# Patient Record
Sex: FEMALE | Race: OTHER | HISPANIC OR LATINO | Employment: UNEMPLOYED | URBAN - METROPOLITAN AREA
[De-identification: names, ages, dates, MRNs, and addresses within clinical notes are randomized per-mention and may not be internally consistent; named-entity substitution may affect disease eponyms.]

---

## 2018-05-29 ENCOUNTER — HOSPITAL ENCOUNTER (EMERGENCY)
Facility: HOSPITAL | Age: 48
Discharge: LEFT BEFORE DISCHARGE | End: 2018-05-29
Attending: EMERGENCY MEDICINE
Payer: COMMERCIAL

## 2018-05-29 VITALS
RESPIRATION RATE: 19 BRPM | HEIGHT: 63 IN | OXYGEN SATURATION: 100 % | SYSTOLIC BLOOD PRESSURE: 197 MMHG | BODY MASS INDEX: 31.89 KG/M2 | DIASTOLIC BLOOD PRESSURE: 109 MMHG | HEART RATE: 57 BPM | WEIGHT: 180 LBS | TEMPERATURE: 97.7 F

## 2018-05-29 DIAGNOSIS — K08.89 PAIN, DENTAL: Primary | ICD-10-CM

## 2018-05-29 PROCEDURE — 99283 EMERGENCY DEPT VISIT LOW MDM: CPT

## 2018-05-29 RX ORDER — AMOXICILLIN 500 MG/1
500 CAPSULE ORAL EVERY 8 HOURS SCHEDULED
COMMUNITY
End: 2019-07-09

## 2018-05-29 RX ORDER — OXYCODONE HYDROCHLORIDE AND ACETAMINOPHEN 5; 325 MG/1; MG/1
1 TABLET ORAL ONCE
Status: COMPLETED | OUTPATIENT
Start: 2018-05-29 | End: 2018-05-29

## 2018-05-29 RX ORDER — IBUPROFEN 600 MG/1
600 TABLET ORAL EVERY 6 HOURS PRN
COMMUNITY
End: 2019-07-09

## 2018-05-29 RX ADMIN — OXYCODONE HYDROCHLORIDE AND ACETAMINOPHEN 1 TABLET: 5; 325 TABLET ORAL at 05:03

## 2018-05-29 NOTE — ED ATTENDING ATTESTATION
Verona Valles MD, saw and evaluated the patient  I have discussed the patient with the resident/non-physician practitioner and agree with the resident's/non-physician practitioner's findings, Plan of Care, and MDM as documented in the resident's/non-physician practitioner's note, except where noted  All available labs and Radiology studies were reviewed  At this point I agree with the current assessment done in the Emergency Department  I have conducted an independent evaluation of this patient a history and physical is as follows:    OA: 54 y/o f c/o dental pain x 1 week  Pain is over L upper incisor  Seen by dentist on Thursday and prescribed abx and motrin for pain control however sxms persistent so presents now for evaluation  Also noted some mild swelling today  No fevers/chills  No difficulty swallowing  No neck pain  No headache  No eye pain/visual change  No fevers  PE, well developed f, HTN, noncompliant with medication x years, NC/AT, MMM, PERRL, EOMIB, + palpable area of induration over L incisor as well as along maxillary sinus with mild swelling and small patch of erythema, + ttp, no warmth  Neck supple/FROM/-LN, RR, lungs CTAB, abd soft, NT/ND, +BS, -r/g, - LE edema/calf ttp, + 2 distal pulses and capillary refill < 2 sec, AAOx3  A/p dental pain, concern for abscess, discussed need for imaging and possibility of worsening infection/progression  Discussion in [address] and [address]  Family at bedside express understanding  Pt given pain control while thinking about imaging       Critical Care Time  CritCare Time    Procedures

## 2018-05-29 NOTE — ED PROVIDER NOTES
History  Chief Complaint   Patient presents with    Dental Pain     Pt  reports left upper molar pain and pressure  Denies fever/chills  This is a 27-year-old female who past medical history presents to the emergency department this morning with left maxillary dental pain for the past week  Patient states that she saw a dentist last Thursday who prescribed her amoxicillin ibuprofen  However, patient states that these medications have not helped and she is coming in for further evaluation  Patient denies taking any other medications, she has not had any dental operations in her mouth recently, and denies any trauma  Patient denies any facial numbness but states that it feels like her face is swollen  Patient denies any fevers or chills  Prior to Admission Medications   Prescriptions Last Dose Informant Patient Reported? Taking?   amoxicillin (AMOXIL) 500 mg capsule 5/28/2018 at Unknown time  Yes Yes   Sig: Take 500 mg by mouth every 8 (eight) hours   ibuprofen (MOTRIN) 600 mg tablet 5/28/2018 at Unknown time  Yes Yes   Sig: Take 600 mg by mouth every 6 (six) hours as needed for mild pain      Facility-Administered Medications: None       Past Medical History:   Diagnosis Date    Hypertension        History reviewed  No pertinent surgical history  History reviewed  No pertinent family history  I have reviewed and agree with the history as documented  Social History   Substance Use Topics    Smoking status: Never Smoker    Smokeless tobacco: Never Used    Alcohol use No        Review of Systems   Constitutional: Negative for chills, fatigue and fever  HENT: Positive for dental problem  Negative for congestion, rhinorrhea, sinus pressure and sore throat  Eyes: Negative for visual disturbance  Respiratory: Negative for cough and shortness of breath  Cardiovascular: Negative for chest pain     Gastrointestinal: Negative for abdominal pain, constipation, diarrhea, nausea and vomiting  Genitourinary: Negative for dysuria, frequency, hematuria and urgency  Musculoskeletal: Negative for arthralgias and myalgias  Skin: Negative for color change and rash  Neurological: Negative for dizziness, light-headedness and numbness  Physical Exam  ED Triage Vitals [05/29/18 0312]   Temperature Pulse Respirations Blood Pressure SpO2   97 7 °F (36 5 °C) 57 19 (!) 197/109 100 %      Temp Source Heart Rate Source Patient Position - Orthostatic VS BP Location FiO2 (%)   Oral Monitor Sitting Left arm --      Pain Score       Worst Possible Pain           Orthostatic Vital Signs  Vitals:    05/29/18 0312   BP: (!) 197/109   Pulse: 57   Patient Position - Orthostatic VS: Sitting       Physical Exam   Constitutional: She is oriented to person, place, and time  She appears well-developed and well-nourished  No distress  HENT:   Head: Normocephalic and atraumatic  Patient was small, half a cm area of erythema and edema where the upper lip meets the maxillary gingiva near tooth 12 and 13  Patient's left side of her face also edematous compared to the right with induration tracking up along the left nasal labial fold  Eyes: Conjunctivae are normal  Pupils are equal, round, and reactive to light  Right eye exhibits no discharge  Left eye exhibits no discharge  Neck: Normal range of motion  Cardiovascular: Normal rate, regular rhythm and normal heart sounds  Exam reveals no gallop and no friction rub  No murmur heard  Pulmonary/Chest: Effort normal and breath sounds normal  No stridor  No respiratory distress  She has no wheezes  She has no rales  Abdominal: Soft  Bowel sounds are normal  She exhibits no distension  There is no tenderness  There is no guarding  Musculoskeletal: Normal range of motion  She exhibits no edema, tenderness or deformity  Neurological: She is alert and oriented to person, place, and time  No cranial nerve deficit or sensory deficit   She exhibits normal muscle tone  Skin: Skin is warm and dry  She is not diaphoretic  Psychiatric: She has a normal mood and affect  Her behavior is normal    Nursing note and vitals reviewed  ED Medications  Medications   oxyCODONE-acetaminophen (PERCOCET) 5-325 mg per tablet 1 tablet (1 tablet Oral Given 5/29/18 0503)       Diagnostic Studies  Results Reviewed     None                 No orders to display         Procedures  Procedures      Phone Consults  ED Phone Contact    ED Course                               MDM  Number of Diagnoses or Management Options  Pain, dental:   Diagnosis management comments:   Offered patient a CT scan of her face, which she refused because she did not want a wait  Patient was made to sign out against medical advice after giving her one Percocet for her pain  Patient states that she will call her dentist today and trying get an appointment for follow-up  Patient given instructions to return to the emergency department should she develop any new or concerning symptoms  CritCare Time    Disposition  Final diagnoses:   Pain, dental     Time reflects when diagnosis was documented in both MDM as applicable and the Disposition within this note     Time User Action Codes Description Comment    5/29/2018  5:19 AM Tawana Walden Add [K08 89] Pain, dental       ED Disposition     ED Disposition Condition Comment    AMA  Date: 5/29/2018  Patient: Jaimie Urrutia  Admitted: 5/29/2018  3:15 AM  Attending Provider: MD Jaimie Raymond or her authorized caregiver has made the decision for the patient to leave the emergency department against the advice of her  attending physician  She or her authorized caregiver has been informed and understands the inherent risks, including death, infection, permanent disability, sepsis, stroke, death  She or her authorized caregiver has decided to accept the responsibility  for this decision   Jaimie Urrutia and all necessary parties have been advised that she may return for further evaluation or treatment  Her condition at time of discharge was good  Marita Hodgkin had current vital signs as follows:  BP (!) 197/109 (BP Locat ion: Left arm)   Pulse 57   Temp 97 7 °F (36 5 °C) (Oral)   Resp 19   Ht 5' 3" (1 6 m)   Wt 81 6 kg (180 lb)         Follow-up Information     Follow up With Specialties Details Why [address], DO Internal Medicine   [address]  [phone]      Pablo Mays DMD [facility]   [address] [facility] [place] [address]  [phone]            Discharge Medication List as of 5/29/2018  5:21 AM      CONTINUE these medications which have NOT CHANGED    Details   amoxicillin (AMOXIL) 500 mg capsule Take 500 mg by mouth every 8 (eight) hours, Historical Med      ibuprofen (MOTRIN) 600 mg tablet Take 600 mg by mouth every 6 (six) hours as needed for mild pain, Historical Med           No discharge procedures on file  ED Provider  Attending physically available and evaluated Marita Hodgkin  I managed the patient along with the ED Attending      Electronically Signed by         Maximo Meraz MD  05/30/18 9853

## 2018-05-29 NOTE — DISCHARGE INSTRUCTIONS
Dolor de [employer]   LO QUE NECESITA SABER:   Un dolor de SMITH'S GREEN que es causado por kika inflamación del nervio en el centro del diente  La irritación puede ser causada por varios problemas, manish por caries, kika infección, un diente vencido o enfermedad de las encías  Es muy importante que acuda a kika pilar de control con hardin odontólogo para que le puedan diagnosticar la causa de hardin dolor de SMITH'S GREEN y recibir tratamiento  Lo cual puede ayudar a prevenir problemas serios  Jefry Rosa [facility]:   Medicamentos:  Es posible que usted necesite alguno de los siguientes:  · AINEs (Analgésicos antiinflamatorios no esteroides)  disminuyen la inflamación y el dolor  Martita medicamento se puede comprar con o sin receta médica  Martita medicamento puede causar sangrado estomacal o problemas en los riñones en ciertas personas  Si usted keturah un medicamento anticoagulante, asegúrese de preguntarle a hardin médico si los MEENAKSHI son seguros para usted  Scarlet siempre la etiqueta y siga cuidadosamente las instrucciones antes de usar martita medicamento  · El acetaminofén  [employer]  Está disponible sin receta médica  Pregunte la cantidad y la frecuencia con que debe tomarlos  [address]  El acetaminofén puede causar daño en el [employer]ado cuando no se keturah de forma correcta  · Los analgésicos  hardin forma de presentación puede ser en píldora o manish un medicamento que se aplica directamente en el diente o las encías  No espere hasta que el dolor sea severo antes de ed martita medicamento  · Antibióticos  contribuyen a combatir o evitar que el jarret contraiga kika infección causada por kika bacteria  Tómelos nathanael [employer]  · [place] gatito medicamentos manish se le haya indicado  Consulte con hardin médico si usted candida que hardin medicamento no le está ayudando o si presenta efectos secundarios  Infórmele si es alérgico a algún medicamento   Mantenga kika lista actualizada de los [place], las vitaminas y los productos herbales que keturah  Incluya los siguientes datos de los medicamentos: cantidad, frecuencia y motivo de administración  Traiga con usted la lista o los envases de la píldoras a gatito citas de seguimiento  Lleve la lista de los medicamentos con usted en michelle de kika emergencia  Programe kika pilar de seguimiento con flores dentista nathanael manish se le indica:  Es posible que lo refieran a un odontólogo cirujano  Anote gatito preguntas para que se acuerde de hacerlas jackie gatito visitas  Cuidados personales:   · Enjuague la boca con agua tibia con sal 4 veces al día o según las indicaciones  · Es posible que necesite comer alimentos blandos para aliviar el dolor que le causa masticar  Comuníquese con flores dentista si:   · Usted tiene preguntas o inquietudes acerca de flores condición o cuidado  Regrese a la callum de emergencias si:   · Usted tiene dificultad para respirar  · Usted tiene flores yazmin o andrew inflamados  · Usted tiene fiebre o escalofríos  · Usted tiene dificultad para hablar o tragar  · Usted tiene dificultad para abrir o cerrar la boca  © 2017 [address] Information is for End User's use only and may not be sold, redistributed or otherwise used for commercial purposes  All illustrations and images included in CareNotes® are the copyrighted property of A D A M , Inc  or Mars Betts  Esta información es sólo para uso en educación  Flores intención no es darle un consejo médico sobre enfermedades o tratamientos  Colsulte con flores [employer] farm[employer] antes de seguir cualquier régimen médico para saber si es seguro y efectivo para usted

## 2019-07-08 PROBLEM — Z86.718 HISTORY OF DVT (DEEP VEIN THROMBOSIS): Status: ACTIVE | Noted: 2019-07-08

## 2019-07-09 ENCOUNTER — TRANSCRIBE ORDERS (OUTPATIENT)
Dept: ADMINISTRATIVE | Facility: HOSPITAL | Age: 49
End: 2019-07-09

## 2019-07-09 ENCOUNTER — OFFICE VISIT (OUTPATIENT)
Dept: FAMILY MEDICINE CLINIC | Facility: CLINIC | Age: 49
End: 2019-07-09
Payer: COMMERCIAL

## 2019-07-09 VITALS
TEMPERATURE: 98.3 F | BODY MASS INDEX: 34.23 KG/M2 | WEIGHT: 200.5 LBS | DIASTOLIC BLOOD PRESSURE: 84 MMHG | SYSTOLIC BLOOD PRESSURE: 112 MMHG | HEIGHT: 64 IN | OXYGEN SATURATION: 96 % | HEART RATE: 83 BPM | RESPIRATION RATE: 16 BRPM

## 2019-07-09 DIAGNOSIS — G44.211 INTRACTABLE EPISODIC TENSION-TYPE HEADACHE: Primary | ICD-10-CM

## 2019-07-09 DIAGNOSIS — K21.9 GASTROESOPHAGEAL REFLUX DISEASE, ESOPHAGITIS PRESENCE NOT SPECIFIED: ICD-10-CM

## 2019-07-09 DIAGNOSIS — Z12.31 SCREENING MAMMOGRAM, ENCOUNTER FOR: ICD-10-CM

## 2019-07-09 DIAGNOSIS — Z13.220 LIPID SCREENING: ICD-10-CM

## 2019-07-09 DIAGNOSIS — H11.001 PTERYGIUM EYE, RIGHT: ICD-10-CM

## 2019-07-09 DIAGNOSIS — Z12.31 ENCOUNTER FOR SCREENING MAMMOGRAM FOR MALIGNANT NEOPLASM OF BREAST: Primary | ICD-10-CM

## 2019-07-09 DIAGNOSIS — E66.9 OBESITY (BMI 30.0-34.9): ICD-10-CM

## 2019-07-09 PROBLEM — G44.209 TENSION TYPE HEADACHE: Status: ACTIVE | Noted: 2019-07-09

## 2019-07-09 PROBLEM — Z12.39 BREAST CANCER SCREENING: Status: ACTIVE | Noted: 2019-07-09

## 2019-07-09 PROBLEM — E66.811 OBESITY (BMI 30.0-34.9): Status: ACTIVE | Noted: 2019-07-09

## 2019-07-09 PROBLEM — Z12.39 BREAST CANCER SCREENING: Status: RESOLVED | Noted: 2019-07-09 | Resolved: 2019-07-09

## 2019-07-09 PROCEDURE — 3725F SCREEN DEPRESSION PERFORMED: CPT | Performed by: PHYSICIAN ASSISTANT

## 2019-07-09 PROCEDURE — 99214 OFFICE O/P EST MOD 30 MIN: CPT | Performed by: PHYSICIAN ASSISTANT

## 2019-07-09 PROCEDURE — 3008F BODY MASS INDEX DOCD: CPT | Performed by: PHYSICIAN ASSISTANT

## 2019-07-09 RX ORDER — IBUPROFEN 600 MG/1
TABLET ORAL
Qty: 40 TABLET | Refills: 1 | Status: SHIPPED | OUTPATIENT
Start: 2019-07-09 | End: 2021-02-23 | Stop reason: ALTCHOICE

## 2019-07-09 RX ORDER — FAMOTIDINE 20 MG/1
TABLET, FILM COATED ORAL
Qty: 90 TABLET | Refills: 1 | Status: SHIPPED | OUTPATIENT
Start: 2019-07-09 | End: 2021-02-23

## 2019-07-09 NOTE — PROGRESS NOTES
Assessment/Plan:    BMI Counseling: Body mass index is 34 96 kg/m²  Discussed the patient's BMI with her  The BMI is above average  BMI counseling and education was provided to the patient  Nutrition recommendations include reducing portion sizes and decreasing overall calorie intake  Exercise recommendations include exercising 3-5 times per week  Patient Instructions   Patient advised to continue with ibuprofen but I did send a prescription strength of 600 mg to take at onset of headache with food, no alcohol as needed  Follow-up if headaches become more frequent more than twice a month    Take Famotidine 20 mg as needed once daily for reflux symptoms which is also infrequent  Follow-up if symptoms become more persistent  Avoid spicy, acidic, citrus foods  Continue daily water intake  Importance of walking daily for at least 20 minutes for exercise and weight loss discussed  Proceed with fasting blood work  Mammogram ordered and scheduled  Continue follow-up with the eye doctor for the pterygium      [email or web address] software was used to dictate this note  It may contain errors with dictating incorrect words/spelling  Please contact provider directly for any questions  Diagnoses and all orders for this visit:    Intractable episodic tension-type headache  -     ibuprofen (MOTRIN) 600 mg tablet; Take 1 tablet every 6 hours as needed for headache with food, no alcohol  -     Comprehensive metabolic panel  -     Lipid panel  -     TSH, 3rd generation with Free T4 reflex  -     CBC and differential    Gastroesophageal reflux disease, esophagitis presence not specified  -     famotidine (PEPCID) 20 mg tablet;  Take 1 tablet daily as needed for reflux symptoms  -     Comprehensive metabolic panel  -     Lipid panel  -     TSH, 3rd generation with Free T4 reflex  -     CBC and differential    Pterygium eye, right    Screening mammogram, encounter for    Lipid screening  -     Comprehensive metabolic panel  -     Lipid panel  -     TSH, 3rd generation with Free T4 reflex  -     CBC and differential    Obesity (BMI 30 0-34 9)  -     Comprehensive metabolic panel  -     Lipid panel  -     TSH, 3rd generation with Free T4 reflex  -     CBC and differential          Subjective:      Patient ID: Rc Ervin is a 51 y o  female  Patient presents today with her 6year-old daughter who helps a little bit with translation in [address]  Patient does understand a lot of [place]  She has several concerns at this time  Over the last several months she states that she has been experiencing a headache at least once or twice a month  She states his across her forehead  She takes over-the-counter ibuprofen 400 mg which alleviates the headache  She denies any visual changes, nausea, vomiting associated with the headache  She also complains of intermittent reflux symptoms with a feeling of wanting to vomit at least once a month  She does not notice it with any specific foods  She denies any nausea, vomiting or changes in her bowels  She has not tried any treatment over-the-counter  She does not exercise  She does drink water all day  It has been quite awhile since she has had any blood work  She states has been quite awhile since she is taking any medication for her blood pressure which has been under good control without medicine  She did see the eye doctor several months ago  She states the doctor is aware of the pterygium which he is monitoring  She does were glasses  The following portions of the patient's history were reviewed and updated as appropriate:   She  has a past medical history of Hypertension    She   Patient Active Problem List    Diagnosis Date Noted    Tension type headache 07/09/2019    GERD (gastroesophageal reflux disease) 07/09/2019    Pterygium eye, right 07/09/2019    Lipid screening 07/09/2019    Obesity (BMI 30 0-34 9) 07/09/2019    History of DVT (deep vein thrombosis) 07/08/2019 She  has a past surgical history that includes Other surgical history  Her family history is not on file  She  reports that she has never smoked  She has never used smokeless tobacco  She reports that she does not drink alcohol or use drugs  Current Outpatient Medications   Medication Sig Dispense Refill    famotidine (PEPCID) 20 mg tablet Take 1 tablet daily as needed for reflux symptoms 90 tablet 1    ibuprofen (MOTRIN) 600 mg tablet Take 1 tablet every 6 hours as needed for headache with food, no alcohol 40 tablet 1     No current facility-administered medications for this visit  Current Outpatient Medications on File Prior to Visit   Medication Sig    [DISCONTINUED] amoxicillin (AMOXIL) 500 mg capsule Take 500 mg by mouth every 8 (eight) hours    [DISCONTINUED] aspirin 81 MG tablet Take 1 tablet by mouth daily    [DISCONTINUED] hydrochlorothiazide (HYDRODIURIL) 25 mg tablet Take 1 tablet by mouth daily    [DISCONTINUED] ibuprofen (MOTRIN) 600 mg tablet Take 600 mg by mouth every 6 (six) hours as needed for mild pain    [DISCONTINUED] lisinopril (ZESTRIL) 10 mg tablet Take 1 tablet by mouth daily     No current facility-administered medications on file prior to visit  She has No Known Allergies       Review of Systems   Constitutional: Negative  HENT: Negative  Eyes:        As stated in HPI   Respiratory: Negative  Cardiovascular: Negative  Gastrointestinal:        As stated in HPI   Endocrine: Negative  Genitourinary: Negative  Musculoskeletal: Negative  Allergic/Immunologic: Negative  Neurological:        As stated in HPI   Hematological: Negative  Psychiatric/Behavioral: Negative            Objective:      /84 (BP Location: Left arm, Patient Position: Sitting, Cuff Size: Large)   Pulse 83   Temp 98 3 °F (36 8 °C) (Tympanic)   Resp 16   Ht 5' 3 5" (1 613 m)   Wt 90 9 kg (200 lb 8 oz)   SpO2 96%   BMI 34 96 kg/m²          Physical Exam Constitutional: She appears well-developed and well-nourished  No distress  HENT:   Head: Normocephalic and atraumatic  Right Ear: External ear normal    Left Ear: External ear normal    Nose: Nose normal    Mouth/Throat: Oropharynx is clear and moist    Eyes: Pupils are equal, round, and reactive to light  Conjunctivae and EOM are normal    Pterygium noted of right medial scleral region   Neck: Normal range of motion  Neck supple  No thyromegaly present  Cardiovascular: Normal rate, regular rhythm and normal heart sounds  Exam reveals no gallop and no friction rub  No murmur heard  Pulmonary/Chest: Effort normal and breath sounds normal  No respiratory distress  She has no wheezes  She has no rales  Abdominal: Soft  Bowel sounds are normal  She exhibits no mass  There is no tenderness  Musculoskeletal: She exhibits no edema or deformity  Lymphadenopathy:     She has no cervical adenopathy  Neurological: She is alert  Skin: Skin is warm  Psychiatric: She has a normal mood and affect

## 2019-07-09 NOTE — PATIENT INSTRUCTIONS
Patient advised to continue with ibuprofen but I did send a prescription strength of 600 mg to take at onset of headache with food, no alcohol as needed  Follow-up if headaches become more frequent more than twice a month    Take Famotidine 20 mg as needed once daily for reflux symptoms which is also infrequent    Follow-up if symptoms become more persistent  Avoid spicy, acidic, citrus foods  Continue daily water intake  Importance of walking daily for at least 20 minutes for exercise and weight loss discussed  Proceed with fasting blood work  Mammogram ordered and scheduled  Continue follow-up with the eye doctor for the pterygium

## 2019-07-16 ENCOUNTER — APPOINTMENT (OUTPATIENT)
Dept: LAB | Facility: IMAGING CENTER | Age: 49
End: 2019-07-16
Payer: COMMERCIAL

## 2019-07-16 ENCOUNTER — HOSPITAL ENCOUNTER (OUTPATIENT)
Dept: RADIOLOGY | Facility: IMAGING CENTER | Age: 49
Discharge: HOME | End: 2019-07-16
Payer: COMMERCIAL

## 2019-07-16 VITALS — BODY MASS INDEX: 34.15 KG/M2 | WEIGHT: 200 LBS | HEIGHT: 64 IN

## 2019-07-16 DIAGNOSIS — Z12.31 ENCOUNTER FOR SCREENING MAMMOGRAM FOR MALIGNANT NEOPLASM OF BREAST: ICD-10-CM

## 2019-07-16 LAB
ALBUMIN SERPL BCP-MCNC: 3.7 G/DL (ref 3.5–5)
ALP SERPL-CCNC: 60 U/L (ref 46–116)
ALT SERPL W P-5'-P-CCNC: 19 U/L (ref 12–78)
ANION GAP SERPL CALCULATED.3IONS-SCNC: 5 MMOL/L (ref 4–13)
AST SERPL W P-5'-P-CCNC: 12 U/L (ref 5–45)
BASOPHILS # BLD AUTO: 0.05 THOUSANDS/ΜL (ref 0–0.1)
BASOPHILS NFR BLD AUTO: 1 % (ref 0–1)
BILIRUB SERPL-MCNC: 0.87 MG/DL (ref 0.2–1)
BUN SERPL-MCNC: 11 MG/DL (ref 5–25)
CALCIUM SERPL-MCNC: 9.1 MG/DL (ref 8.3–10.1)
CHLORIDE SERPL-SCNC: 109 MMOL/L (ref 100–108)
CHOLEST SERPL-MCNC: 156 MG/DL (ref 50–200)
CO2 SERPL-SCNC: 26 MMOL/L (ref 21–32)
CREAT SERPL-MCNC: 0.68 MG/DL (ref 0.6–1.3)
EOSINOPHIL # BLD AUTO: 0.19 THOUSAND/ΜL (ref 0–0.61)
EOSINOPHIL NFR BLD AUTO: 3 % (ref 0–6)
ERYTHROCYTE [DISTWIDTH] IN BLOOD BY AUTOMATED COUNT: 12.9 % (ref 11.6–15.1)
GFR SERPL CREATININE-BSD FRML MDRD: 104 ML/MIN/1.73SQ M
GLUCOSE P FAST SERPL-MCNC: 90 MG/DL (ref 65–99)
HCT VFR BLD AUTO: 40.1 % (ref 34.8–46.1)
HDLC SERPL-MCNC: 63 MG/DL (ref 40–60)
HGB BLD-MCNC: 13 G/DL (ref 11.5–15.4)
IMM GRANULOCYTES # BLD AUTO: 0.02 THOUSAND/UL (ref 0–0.2)
IMM GRANULOCYTES NFR BLD AUTO: 0 % (ref 0–2)
LDLC SERPL CALC-MCNC: 83 MG/DL (ref 0–100)
LYMPHOCYTES # BLD AUTO: 2.36 THOUSANDS/ΜL (ref 0.6–4.47)
LYMPHOCYTES NFR BLD AUTO: 33 % (ref 14–44)
MCH RBC QN AUTO: 28.9 PG (ref 26.8–34.3)
MCHC RBC AUTO-ENTMCNC: 32.4 G/DL (ref 31.4–37.4)
MCV RBC AUTO: 89 FL (ref 82–98)
MONOCYTES # BLD AUTO: 0.48 THOUSAND/ΜL (ref 0.17–1.22)
MONOCYTES NFR BLD AUTO: 7 % (ref 4–12)
NEUTROPHILS # BLD AUTO: 4.03 THOUSANDS/ΜL (ref 1.85–7.62)
NEUTS SEG NFR BLD AUTO: 56 % (ref 43–75)
NONHDLC SERPL-MCNC: 93 MG/DL
NRBC BLD AUTO-RTO: 0 /100 WBCS
PLATELET # BLD AUTO: 294 THOUSANDS/UL (ref 149–390)
PMV BLD AUTO: 10.7 FL (ref 8.9–12.7)
POTASSIUM SERPL-SCNC: 4 MMOL/L (ref 3.5–5.3)
PROT SERPL-MCNC: 7.6 G/DL (ref 6.4–8.2)
RBC # BLD AUTO: 4.5 MILLION/UL (ref 3.81–5.12)
SODIUM SERPL-SCNC: 140 MMOL/L (ref 136–145)
TRIGL SERPL-MCNC: 51 MG/DL
TSH SERPL DL<=0.05 MIU/L-ACNC: 1.09 UIU/ML (ref 0.36–3.74)
WBC # BLD AUTO: 7.13 THOUSAND/UL (ref 4.31–10.16)

## 2019-07-16 PROCEDURE — 36415 COLL VENOUS BLD VENIPUNCTURE: CPT | Performed by: PHYSICIAN ASSISTANT

## 2019-07-16 PROCEDURE — 80053 COMPREHEN METABOLIC PANEL: CPT | Performed by: PHYSICIAN ASSISTANT

## 2019-07-16 PROCEDURE — 80061 LIPID PANEL: CPT | Performed by: PHYSICIAN ASSISTANT

## 2019-07-16 PROCEDURE — 84443 ASSAY THYROID STIM HORMONE: CPT | Performed by: PHYSICIAN ASSISTANT

## 2019-07-16 PROCEDURE — 77067 SCR MAMMO BI INCL CAD: CPT

## 2019-07-16 PROCEDURE — 85025 COMPLETE CBC W/AUTO DIFF WBC: CPT | Performed by: PHYSICIAN ASSISTANT

## 2019-07-19 ENCOUNTER — TELEPHONE (OUTPATIENT)
Dept: FAMILY MEDICINE CLINIC | Facility: CLINIC | Age: 49
End: 2019-07-19

## 2019-07-19 NOTE — TELEPHONE ENCOUNTER
----- Message from Nessa Soriano PA-C sent at 7/18/2019  7:43 AM EDT -----  Patient is primarily Turkmen-speaking    Recent blood work is normal for kidneys, liver, blood sugar, thyroid, blood count, cholesterol which is excellent 156, normal is below 200

## 2020-06-17 ENCOUNTER — OFFICE VISIT (OUTPATIENT)
Dept: FAMILY MEDICINE CLINIC | Facility: CLINIC | Age: 50
End: 2020-06-17
Payer: COMMERCIAL

## 2020-06-17 VITALS
HEIGHT: 64 IN | BODY MASS INDEX: 35.58 KG/M2 | OXYGEN SATURATION: 99 % | TEMPERATURE: 97.1 F | HEART RATE: 65 BPM | WEIGHT: 208.4 LBS | SYSTOLIC BLOOD PRESSURE: 160 MMHG | DIASTOLIC BLOOD PRESSURE: 96 MMHG | RESPIRATION RATE: 16 BRPM

## 2020-06-17 DIAGNOSIS — K21.9 GASTROESOPHAGEAL REFLUX DISEASE, ESOPHAGITIS PRESENCE NOT SPECIFIED: ICD-10-CM

## 2020-06-17 DIAGNOSIS — I10 ESSENTIAL HYPERTENSION: ICD-10-CM

## 2020-06-17 DIAGNOSIS — M54.2 NECK PAIN: Primary | ICD-10-CM

## 2020-06-17 DIAGNOSIS — R23.2 HOT FLASHES: ICD-10-CM

## 2020-06-17 DIAGNOSIS — G44.211 INTRACTABLE EPISODIC TENSION-TYPE HEADACHE: ICD-10-CM

## 2020-06-17 DIAGNOSIS — Z12.31 BREAST CANCER SCREENING BY MAMMOGRAM: ICD-10-CM

## 2020-06-17 DIAGNOSIS — Z13.220 LIPID SCREENING: ICD-10-CM

## 2020-06-17 PROCEDURE — 99214 OFFICE O/P EST MOD 30 MIN: CPT | Performed by: FAMILY MEDICINE

## 2020-06-17 PROCEDURE — 1036F TOBACCO NON-USER: CPT | Performed by: FAMILY MEDICINE

## 2020-06-17 PROCEDURE — 3008F BODY MASS INDEX DOCD: CPT | Performed by: FAMILY MEDICINE

## 2020-06-17 PROCEDURE — 3077F SYST BP >= 140 MM HG: CPT | Performed by: FAMILY MEDICINE

## 2020-06-17 PROCEDURE — 3080F DIAST BP >= 90 MM HG: CPT | Performed by: FAMILY MEDICINE

## 2020-06-17 RX ORDER — CYCLOBENZAPRINE HCL 10 MG
10 TABLET ORAL
Qty: 30 TABLET | Refills: 0 | Status: SHIPPED | OUTPATIENT
Start: 2020-06-17 | End: 2020-07-13

## 2020-06-17 RX ORDER — PANTOPRAZOLE SODIUM 40 MG/1
40 TABLET, DELAYED RELEASE ORAL
Qty: 30 TABLET | Refills: 1 | Status: SHIPPED | OUTPATIENT
Start: 2020-06-17 | End: 2020-08-11

## 2020-06-17 RX ORDER — ACETAMINOPHEN 500 MG
500 TABLET ORAL EVERY 6 HOURS PRN
COMMUNITY

## 2020-06-17 RX ORDER — LISINOPRIL 10 MG/1
10 TABLET ORAL DAILY
Qty: 30 TABLET | Refills: 2 | Status: SHIPPED | OUTPATIENT
Start: 2020-06-17 | End: 2020-07-21 | Stop reason: SDUPTHER

## 2020-06-17 RX ORDER — LISINOPRIL 10 MG/1
10 TABLET ORAL
COMMUNITY
End: 2020-06-17 | Stop reason: SDUPTHER

## 2020-06-17 RX ORDER — TRAMADOL HYDROCHLORIDE 50 MG/1
50 TABLET ORAL
COMMUNITY
Start: 2011-12-13 | End: 2020-08-20

## 2020-06-25 ENCOUNTER — APPOINTMENT (OUTPATIENT)
Dept: LAB | Facility: IMAGING CENTER | Age: 50
End: 2020-06-25
Payer: COMMERCIAL

## 2020-06-25 DIAGNOSIS — Z13.220 LIPID SCREENING: ICD-10-CM

## 2020-06-25 DIAGNOSIS — I10 ESSENTIAL HYPERTENSION: ICD-10-CM

## 2020-06-25 LAB
ALBUMIN SERPL BCP-MCNC: 3.7 G/DL (ref 3.5–5)
ALP SERPL-CCNC: 65 U/L (ref 46–116)
ALT SERPL W P-5'-P-CCNC: 32 U/L (ref 12–78)
ANION GAP SERPL CALCULATED.3IONS-SCNC: 6 MMOL/L (ref 4–13)
AST SERPL W P-5'-P-CCNC: 19 U/L (ref 5–45)
BASOPHILS # BLD AUTO: 0.05 THOUSANDS/ΜL (ref 0–0.1)
BASOPHILS NFR BLD AUTO: 1 % (ref 0–1)
BILIRUB SERPL-MCNC: 0.71 MG/DL (ref 0.2–1)
BILIRUB UR QL STRIP: NEGATIVE
BUN SERPL-MCNC: 14 MG/DL (ref 5–25)
CALCIUM SERPL-MCNC: 9.6 MG/DL (ref 8.3–10.1)
CHLORIDE SERPL-SCNC: 107 MMOL/L (ref 100–108)
CHOLEST SERPL-MCNC: 194 MG/DL (ref 50–200)
CLARITY UR: CLEAR
CO2 SERPL-SCNC: 26 MMOL/L (ref 21–32)
COLOR UR: YELLOW
CREAT SERPL-MCNC: 0.74 MG/DL (ref 0.6–1.3)
EOSINOPHIL # BLD AUTO: 0.21 THOUSAND/ΜL (ref 0–0.61)
EOSINOPHIL NFR BLD AUTO: 3 % (ref 0–6)
ERYTHROCYTE [DISTWIDTH] IN BLOOD BY AUTOMATED COUNT: 12.5 % (ref 11.6–15.1)
GFR SERPL CREATININE-BSD FRML MDRD: 95 ML/MIN/1.73SQ M
GLUCOSE P FAST SERPL-MCNC: 89 MG/DL (ref 65–99)
GLUCOSE UR STRIP-MCNC: NEGATIVE MG/DL
HCT VFR BLD AUTO: 39.8 % (ref 34.8–46.1)
HDLC SERPL-MCNC: 58 MG/DL
HGB BLD-MCNC: 13.1 G/DL (ref 11.5–15.4)
HGB UR QL STRIP.AUTO: NEGATIVE
IMM GRANULOCYTES # BLD AUTO: 0.01 THOUSAND/UL (ref 0–0.2)
IMM GRANULOCYTES NFR BLD AUTO: 0 % (ref 0–2)
KETONES UR STRIP-MCNC: NEGATIVE MG/DL
LDLC SERPL CALC-MCNC: 122 MG/DL (ref 0–100)
LEUKOCYTE ESTERASE UR QL STRIP: NEGATIVE
LYMPHOCYTES # BLD AUTO: 2.43 THOUSANDS/ΜL (ref 0.6–4.47)
LYMPHOCYTES NFR BLD AUTO: 30 % (ref 14–44)
MCH RBC QN AUTO: 29 PG (ref 26.8–34.3)
MCHC RBC AUTO-ENTMCNC: 32.9 G/DL (ref 31.4–37.4)
MCV RBC AUTO: 88 FL (ref 82–98)
MONOCYTES # BLD AUTO: 0.58 THOUSAND/ΜL (ref 0.17–1.22)
MONOCYTES NFR BLD AUTO: 7 % (ref 4–12)
NEUTROPHILS # BLD AUTO: 4.81 THOUSANDS/ΜL (ref 1.85–7.62)
NEUTS SEG NFR BLD AUTO: 59 % (ref 43–75)
NITRITE UR QL STRIP: NEGATIVE
NRBC BLD AUTO-RTO: 0 /100 WBCS
PH UR STRIP.AUTO: 7.5 [PH]
PLATELET # BLD AUTO: 276 THOUSANDS/UL (ref 149–390)
PMV BLD AUTO: 10.6 FL (ref 8.9–12.7)
POTASSIUM SERPL-SCNC: 3.9 MMOL/L (ref 3.5–5.3)
PROT SERPL-MCNC: 7.8 G/DL (ref 6.4–8.2)
PROT UR STRIP-MCNC: NEGATIVE MG/DL
RBC # BLD AUTO: 4.51 MILLION/UL (ref 3.81–5.12)
SODIUM SERPL-SCNC: 139 MMOL/L (ref 136–145)
SP GR UR STRIP.AUTO: 1.02 (ref 1–1.03)
TRIGL SERPL-MCNC: 70 MG/DL
TSH SERPL DL<=0.05 MIU/L-ACNC: 1.12 UIU/ML (ref 0.36–3.74)
UROBILINOGEN UR QL STRIP.AUTO: 0.2 E.U./DL
WBC # BLD AUTO: 8.09 THOUSAND/UL (ref 4.31–10.16)

## 2020-06-25 PROCEDURE — 81003 URINALYSIS AUTO W/O SCOPE: CPT | Performed by: FAMILY MEDICINE

## 2020-06-25 PROCEDURE — 85025 COMPLETE CBC W/AUTO DIFF WBC: CPT

## 2020-06-25 PROCEDURE — 80053 COMPREHEN METABOLIC PANEL: CPT

## 2020-06-25 PROCEDURE — 80061 LIPID PANEL: CPT

## 2020-06-25 PROCEDURE — 36415 COLL VENOUS BLD VENIPUNCTURE: CPT

## 2020-06-25 PROCEDURE — 84443 ASSAY THYROID STIM HORMONE: CPT

## 2020-06-30 ENCOUNTER — TELEPHONE (OUTPATIENT)
Dept: FAMILY MEDICINE CLINIC | Facility: CLINIC | Age: 50
End: 2020-06-30

## 2020-07-01 ENCOUNTER — TELEPHONE (OUTPATIENT)
Dept: FAMILY MEDICINE CLINIC | Facility: CLINIC | Age: 50
End: 2020-07-01

## 2020-07-01 NOTE — TELEPHONE ENCOUNTER
----- Message from Marcie Swift MD sent at 6/26/2020  8:10 AM EDT -----  Blood work came back showing high cholesterol   all the rest of the blood work came back normal

## 2020-07-13 DIAGNOSIS — M54.2 NECK PAIN: ICD-10-CM

## 2020-07-13 RX ORDER — CYCLOBENZAPRINE HCL 10 MG
TABLET ORAL
Qty: 30 TABLET | Refills: 0 | Status: SHIPPED | OUTPATIENT
Start: 2020-07-13 | End: 2020-11-17 | Stop reason: SDUPTHER

## 2020-07-21 ENCOUNTER — HOSPITAL ENCOUNTER (OUTPATIENT)
Dept: RADIOLOGY | Facility: IMAGING CENTER | Age: 50
Discharge: HOME | End: 2020-07-21
Payer: COMMERCIAL

## 2020-07-21 ENCOUNTER — OFFICE VISIT (OUTPATIENT)
Dept: FAMILY MEDICINE CLINIC | Facility: CLINIC | Age: 50
End: 2020-07-21
Payer: COMMERCIAL

## 2020-07-21 VITALS
TEMPERATURE: 97.8 F | OXYGEN SATURATION: 98 % | HEIGHT: 64 IN | HEART RATE: 78 BPM | RESPIRATION RATE: 16 BRPM | SYSTOLIC BLOOD PRESSURE: 138 MMHG | DIASTOLIC BLOOD PRESSURE: 94 MMHG | BODY MASS INDEX: 35.89 KG/M2 | WEIGHT: 210.2 LBS

## 2020-07-21 VITALS — HEIGHT: 64 IN | BODY MASS INDEX: 34.15 KG/M2 | WEIGHT: 200 LBS

## 2020-07-21 DIAGNOSIS — Z12.31 BREAST CANCER SCREENING BY MAMMOGRAM: ICD-10-CM

## 2020-07-21 DIAGNOSIS — K21.9 GASTROESOPHAGEAL REFLUX DISEASE WITHOUT ESOPHAGITIS: ICD-10-CM

## 2020-07-21 DIAGNOSIS — R23.2 HOT FLASHES: ICD-10-CM

## 2020-07-21 DIAGNOSIS — E78.49 OTHER HYPERLIPIDEMIA: ICD-10-CM

## 2020-07-21 DIAGNOSIS — I10 ESSENTIAL HYPERTENSION: Primary | ICD-10-CM

## 2020-07-21 PROCEDURE — 3008F BODY MASS INDEX DOCD: CPT | Performed by: FAMILY MEDICINE

## 2020-07-21 PROCEDURE — 3075F SYST BP GE 130 - 139MM HG: CPT | Performed by: FAMILY MEDICINE

## 2020-07-21 PROCEDURE — 1036F TOBACCO NON-USER: CPT | Performed by: FAMILY MEDICINE

## 2020-07-21 PROCEDURE — 77067 SCR MAMMO BI INCL CAD: CPT

## 2020-07-21 PROCEDURE — 99214 OFFICE O/P EST MOD 30 MIN: CPT | Performed by: FAMILY MEDICINE

## 2020-07-21 PROCEDURE — 3080F DIAST BP >= 90 MM HG: CPT | Performed by: FAMILY MEDICINE

## 2020-07-21 RX ORDER — LISINOPRIL 20 MG/1
20 TABLET ORAL DAILY
Qty: 30 TABLET | Refills: 3 | Status: SHIPPED | OUTPATIENT
Start: 2020-07-21 | End: 2020-10-27 | Stop reason: SDUPTHER

## 2020-07-21 NOTE — PROGRESS NOTES
Assessment/Plan:  GERD (gastroesophageal reflux disease)  Improved  Continue same  Will continue to monitor  Hypertension  Not well controlled  I am going to increase lisinopril to 20 mg daily  It was discussed about possible side effects  She is to continue to monitor her blood pressure at home  Hot flashes  She is going to see her gynecologist     Other hyperlipidemia  Not well controlled  It was discussed about low-fat diet and regular exercise  Diagnoses and all orders for this visit:    Essential hypertension  -     lisinopril (ZESTRIL) 20 mg tablet; Take 1 tablet (20 mg total) by mouth daily  -     Comprehensive metabolic panel; Future  -     Lipid Panel with Direct LDL reflex; Future    Gastroesophageal reflux disease without esophagitis    Hot flashes    Other hyperlipidemia          There are no Patient Instructions on file for this visit  Return in about 3 months (around 10/21/2020)  Subjective:      Patient ID: Daryl Andrews is a 53 y o  female  Chief Complaint   Patient presents with    Headache     1 month follow up       She is here today for follow-up multiple medical problems  She had her blood work done recently and showed high cholesterol  All the rest of the blood work came back normal   She was started on Protonix and lisinopril  Her GERD symptoms improved  Her blood pressure improving but continues to be elevated  Her headache and neck pain improved  The following portions of the patient's history were reviewed and updated as appropriate: allergies, current medications, past family history, past medical history, past social history, past surgical history and problem list     Review of Systems   Constitutional: Negative for chills and fever  HENT: Negative for trouble swallowing  Eyes: Negative for visual disturbance  Respiratory: Negative for cough and shortness of breath  Cardiovascular: Negative for chest pain, palpitations and leg swelling  Gastrointestinal: Negative for abdominal pain, constipation and diarrhea  Endocrine: Negative for cold intolerance and heat intolerance  Genitourinary: Negative for difficulty urinating and dysuria  Musculoskeletal: Negative for gait problem  Skin: Negative for rash  Neurological: Positive for headaches  Negative for dizziness, tremors and seizures  Hematological: Negative for adenopathy  Psychiatric/Behavioral: Negative for behavioral problems  Current Outpatient Medications   Medication Sig Dispense Refill    acetaminophen (TYLENOL) 500 mg tablet Take 500 mg by mouth every 6 (six) hours as needed for mild pain      cyclobenzaprine (FLEXERIL) 10 mg tablet TAKE 1 TABLET BY MOUTH DAILY AT BEDTIME 30 tablet 0    lisinopril (ZESTRIL) 20 mg tablet Take 1 tablet (20 mg total) by mouth daily 30 tablet 3    pantoprazole (PROTONIX) 40 mg tablet Take 1 tablet (40 mg total) by mouth daily before breakfast 30 tablet 1    famotidine (PEPCID) 20 mg tablet Take 1 tablet daily as needed for reflux symptoms (Patient not taking: Reported on 6/17/2020) 90 tablet 1    ibuprofen (MOTRIN) 600 mg tablet Take 1 tablet every 6 hours as needed for headache with food, no alcohol (Patient not taking: Reported on 6/17/2020) 40 tablet 1    traMADol (ULTRAM) 50 mg tablet Take 50 mg by mouth       No current facility-administered medications for this visit  Objective:    /94 (BP Location: Left arm, Patient Position: Sitting, Cuff Size: Large)   Pulse 78   Temp 97 8 °F (36 6 °C) (Tympanic)   Resp 16   Ht 5' 3 5" (1 613 m)   Wt 95 3 kg (210 lb 3 2 oz)   SpO2 98%   BMI 36 65 kg/m²        Physical Exam   Constitutional: She is oriented to person, place, and time  She appears well-developed and well-nourished  HENT:   Head: Normocephalic and atraumatic  Eyes: Pupils are equal, round, and reactive to light  EOM are normal    Neck: Normal range of motion  Neck supple     Cardiovascular: Normal rate, regular rhythm and normal heart sounds  Pulmonary/Chest: Effort normal and breath sounds normal    Abdominal: Soft  Bowel sounds are normal    Musculoskeletal: Normal range of motion  She exhibits no edema  Lymphadenopathy:     She has no cervical adenopathy  Neurological: She is alert and oriented to person, place, and time  No cranial nerve deficit  Skin: Skin is warm  Psychiatric: She has a normal mood and affect  Nursing note and vitals reviewed               Keisha Ardon MD

## 2020-07-22 PROBLEM — E78.49 OTHER HYPERLIPIDEMIA: Status: ACTIVE | Noted: 2020-07-22

## 2020-07-22 NOTE — ASSESSMENT & PLAN NOTE
Not well controlled  I am going to increase lisinopril to 20 mg daily  It was discussed about possible side effects  She is to continue to monitor her blood pressure at home

## 2020-07-27 ENCOUNTER — TELEPHONE (OUTPATIENT)
Dept: FAMILY MEDICINE CLINIC | Facility: CLINIC | Age: 50
End: 2020-07-27

## 2020-07-27 NOTE — TELEPHONE ENCOUNTER
I checked communication form and left message with normal results   She is to call with any questions

## 2020-07-27 NOTE — TELEPHONE ENCOUNTER
----- Message from Red Delgado MD sent at 7/25/2020 12:35 PM EDT -----  Normal mammogram  Continue routine screening with mammogram in one year

## 2020-08-11 DIAGNOSIS — K21.9 GASTROESOPHAGEAL REFLUX DISEASE, ESOPHAGITIS PRESENCE NOT SPECIFIED: ICD-10-CM

## 2020-08-11 RX ORDER — PANTOPRAZOLE SODIUM 40 MG/1
TABLET, DELAYED RELEASE ORAL
Qty: 30 TABLET | Refills: 1 | Status: SHIPPED | OUTPATIENT
Start: 2020-08-11 | End: 2020-10-18

## 2020-08-19 ENCOUNTER — TELEPHONE (OUTPATIENT)
Dept: OBGYN CLINIC | Facility: CLINIC | Age: 50
End: 2020-08-19

## 2020-08-20 ENCOUNTER — OFFICE VISIT (OUTPATIENT)
Dept: OBGYN CLINIC | Facility: CLINIC | Age: 50
End: 2020-08-20

## 2020-08-20 VITALS
SYSTOLIC BLOOD PRESSURE: 123 MMHG | TEMPERATURE: 97.9 F | HEIGHT: 63 IN | BODY MASS INDEX: 36.86 KG/M2 | HEART RATE: 57 BPM | WEIGHT: 208 LBS | DIASTOLIC BLOOD PRESSURE: 82 MMHG

## 2020-08-20 DIAGNOSIS — Z12.4 ENCOUNTER FOR PAPANICOLAOU SMEAR FOR CERVICAL CANCER SCREENING: Primary | ICD-10-CM

## 2020-08-20 DIAGNOSIS — Z00.00 ANNUAL PHYSICAL EXAM: ICD-10-CM

## 2020-08-20 PROCEDURE — 87624 HPV HI-RISK TYP POOLED RSLT: CPT | Performed by: OBSTETRICS & GYNECOLOGY

## 2020-08-20 PROCEDURE — 3074F SYST BP LT 130 MM HG: CPT | Performed by: OBSTETRICS & GYNECOLOGY

## 2020-08-20 PROCEDURE — G0145 SCR C/V CYTO,THINLAYER,RESCR: HCPCS | Performed by: OBSTETRICS & GYNECOLOGY

## 2020-08-20 PROCEDURE — 1036F TOBACCO NON-USER: CPT | Performed by: OBSTETRICS & GYNECOLOGY

## 2020-08-20 PROCEDURE — 99203 OFFICE O/P NEW LOW 30 MIN: CPT | Performed by: OBSTETRICS & GYNECOLOGY

## 2020-08-20 PROCEDURE — 3725F SCREEN DEPRESSION PERFORMED: CPT | Performed by: OBSTETRICS & GYNECOLOGY

## 2020-08-20 PROCEDURE — 3008F BODY MASS INDEX DOCD: CPT | Performed by: OBSTETRICS & GYNECOLOGY

## 2020-08-20 PROCEDURE — 3079F DIAST BP 80-89 MM HG: CPT | Performed by: OBSTETRICS & GYNECOLOGY

## 2020-08-20 PROCEDURE — 3008F BODY MASS INDEX DOCD: CPT | Performed by: FAMILY MEDICINE

## 2020-08-20 NOTE — PROGRESS NOTES
Subjective      Khris Allen is a 53 y o  female who presents for annual well woman exam  Periods have been regular every 28-30 days, lasting 3 days  This year on February she had her period as normal but later she did not see her period for two months until May and later did not have her period for another two months until now on August 11 2020  No intermenstrual bleeding, spotting, or discharge  GYN:   · No vaginal discharge, labial erythema or lesions, dyspareunia  · Menarche at 16  · Menses are becoming irregular  In the past year she has had two times where her menstrual cycle did not appear for two months  But before this they were regular q 28-30 days, lasting 3 days of light flow  · Contraception: none  · Patient is not sexually active at this moment  Has been sexually active before with only one partner  · Last pap smear was in 2014 or 2015 (does not remember exact date or year)  Results were normal   · None gynecologic surgeries  OB:  · [id number] female  · Pregnancies were normal and uncomplicated  :  · No dysuria, urinary frequency  · Refers infrequent urinary urgency and infrequent incontinence (can occur when laughing, but not coughing)  · No hematuria, flank pain  Breast:  · No complaints of breast mass, skin changes, dimpling, reddening, nipple retraction  · None breast discharge  · Last mammogram was a month ago  Results were normal    · Patient does not have a family history of breast, endometrial, or ovarian ca  General:  · Diet: no specific diet, trying to eat more vegetables  · Exercise: started walking and using a stationary bicycle, less than 2 times per week for 10-15 minutes  Began two weeks ago  · Work: restaurant  · ETOH use: none  · Tobacco use: none  · Recreational drug use: none    Screening:  · Cervical cancer: last pap smear in [id number]  Results were normal   · Breast cancer: last mammogram July 2020   Results were normal     Review of Systems  Constitutional: negative  Respiratory: negative  Cardiovascular: negative  Gastrointestinal: negative  Genitourinary:negative  Integument/breast: negative      Objective      /82 (BP Location: Left arm, Patient Position: Sitting, Cuff Size: Adult)   Pulse 57   Temp 97 9 °F (36 6 °C)   Ht 5' 3" (1 6 m)   Wt 94 3 kg (208 lb)   LMP 08/11/2020   BMI 36 85 kg/m²     Physical Exam  Vitals signs reviewed  Exam conducted with a chaperone present  Constitutional:       General: She is awake  Neck:      Thyroid: No thyroid mass, thyromegaly or thyroid tenderness  Cardiovascular:      Rate and Rhythm: Normal rate  Heart sounds: Normal heart sounds, S1 normal and S2 normal    Pulmonary:      Effort: Pulmonary effort is normal       Breath sounds: Normal breath sounds  No decreased breath sounds, wheezing, rhonchi or rales  Chest:      Breasts:         Right: Normal          Left: Normal    Abdominal:      General: Bowel sounds are normal       Palpations: Abdomen is soft  There is no mass  Tenderness: There is no abdominal tenderness  Genitourinary:     General: Normal vulva  Pubic Area: No rash  Labia:         Right: No rash, tenderness or lesion  Left: No rash, tenderness or lesion  Vagina: Normal       Cervix: Normal       Uterus: Normal        Adnexa: Right adnexa normal and left adnexa normal    Lymphadenopathy:      Cervical: No cervical adenopathy  Upper Body:      Right upper body: No supraclavicular, axillary or pectoral adenopathy  Left upper body: No supraclavicular, axillary or pectoral adenopathy  Neurological:      Mental Status: She is alert  Psychiatric:         Behavior: Behavior is cooperative  Assessment     53year old female annual well physical and encounter for cervical cancer screening       Plan     - Liquid-based pap, screening done  - Mammogram in one year  - Encouraged her to continue exercising and to increase the frequency and intensity as tolerated    - Counseled on increasing non-starchy vegetables, decreasing high caloric sweets and other junk food    - RTO if you have any concerns or any new complaints  - Follow up in one year

## 2020-08-27 LAB
HPV HR 12 DNA CVX QL NAA+PROBE: NEGATIVE
HPV16 DNA CVX QL NAA+PROBE: NEGATIVE
HPV18 DNA CVX QL NAA+PROBE: NEGATIVE

## 2020-08-28 LAB
LAB AP GYN PRIMARY INTERPRETATION: NORMAL
Lab: NORMAL

## 2020-10-18 DIAGNOSIS — K21.9 GASTROESOPHAGEAL REFLUX DISEASE: ICD-10-CM

## 2020-10-18 RX ORDER — PANTOPRAZOLE SODIUM 40 MG/1
TABLET, DELAYED RELEASE ORAL
Qty: 30 TABLET | Refills: 1 | Status: SHIPPED | OUTPATIENT
Start: 2020-10-18 | End: 2020-11-05 | Stop reason: HOSPADM

## 2020-10-27 ENCOUNTER — OFFICE VISIT (OUTPATIENT)
Dept: FAMILY MEDICINE CLINIC | Facility: CLINIC | Age: 50
End: 2020-10-27
Payer: COMMERCIAL

## 2020-10-27 VITALS
RESPIRATION RATE: 16 BRPM | TEMPERATURE: 98.1 F | BODY MASS INDEX: 36.68 KG/M2 | DIASTOLIC BLOOD PRESSURE: 82 MMHG | HEIGHT: 63 IN | SYSTOLIC BLOOD PRESSURE: 116 MMHG | OXYGEN SATURATION: 97 % | WEIGHT: 207 LBS | HEART RATE: 67 BPM

## 2020-10-27 DIAGNOSIS — H00.015 HORDEOLUM EXTERNUM OF LEFT LOWER EYELID: ICD-10-CM

## 2020-10-27 DIAGNOSIS — E78.49 OTHER HYPERLIPIDEMIA: ICD-10-CM

## 2020-10-27 DIAGNOSIS — I10 ESSENTIAL HYPERTENSION: ICD-10-CM

## 2020-10-27 DIAGNOSIS — Z00.00 HEALTHCARE MAINTENANCE: Primary | ICD-10-CM

## 2020-10-27 DIAGNOSIS — K21.9 GASTROESOPHAGEAL REFLUX DISEASE WITHOUT ESOPHAGITIS: ICD-10-CM

## 2020-10-27 PROCEDURE — 99396 PREV VISIT EST AGE 40-64: CPT | Performed by: FAMILY MEDICINE

## 2020-10-27 RX ORDER — ERYTHROMYCIN 5 MG/G
0.5 OINTMENT OPHTHALMIC EVERY 6 HOURS SCHEDULED
Qty: 3.5 G | Refills: 0 | Status: SHIPPED | OUTPATIENT
Start: 2020-10-27 | End: 2020-11-05 | Stop reason: HOSPADM

## 2020-10-27 RX ORDER — LISINOPRIL 20 MG/1
20 TABLET ORAL DAILY
Qty: 30 TABLET | Refills: 3 | Status: SHIPPED | OUTPATIENT
Start: 2020-10-27 | End: 2021-02-10 | Stop reason: SDUPTHER

## 2020-11-04 ENCOUNTER — HOSPITAL ENCOUNTER (OUTPATIENT)
Facility: HOSPITAL | Age: 50
Setting detail: OBSERVATION
Discharge: HOME | End: 2020-11-05
Attending: INTERNAL MEDICINE | Admitting: INTERNAL MEDICINE
Payer: COMMERCIAL

## 2020-11-04 ENCOUNTER — TELEPHONE (OUTPATIENT)
Dept: FAMILY MEDICINE CLINIC | Facility: CLINIC | Age: 50
End: 2020-11-04

## 2020-11-04 ENCOUNTER — APPOINTMENT (EMERGENCY)
Dept: CT IMAGING | Facility: HOSPITAL | Age: 50
End: 2020-11-04
Payer: COMMERCIAL

## 2020-11-04 ENCOUNTER — LAB (OUTPATIENT)
Dept: LAB | Facility: IMAGING CENTER | Age: 50
End: 2020-11-04
Payer: COMMERCIAL

## 2020-11-04 DIAGNOSIS — I10 ESSENTIAL HYPERTENSION: ICD-10-CM

## 2020-11-04 DIAGNOSIS — I20.0 UNSTABLE ANGINA (HCC): Primary | ICD-10-CM

## 2020-11-04 DIAGNOSIS — E66.9 OBESITY (BMI 30.0-34.9): ICD-10-CM

## 2020-11-04 DIAGNOSIS — G47.33 OSA (OBSTRUCTIVE SLEEP APNEA): ICD-10-CM

## 2020-11-04 PROBLEM — R07.9 CHEST PAIN: Status: ACTIVE | Noted: 2020-11-04

## 2020-11-04 LAB
ALBUMIN SERPL BCP-MCNC: 3.7 G/DL (ref 3.5–5)
ALBUMIN SERPL BCP-MCNC: 4.3 G/DL (ref 3.4–4.8)
ALP SERPL-CCNC: 58.1 U/L (ref 35–140)
ALP SERPL-CCNC: 67 U/L (ref 46–116)
ALT SERPL W P-5'-P-CCNC: 12 U/L (ref 5–54)
ALT SERPL W P-5'-P-CCNC: 20 U/L (ref 12–78)
ANION GAP SERPL CALCULATED.3IONS-SCNC: 4 MMOL/L (ref 4–13)
ANION GAP SERPL CALCULATED.3IONS-SCNC: 6 MMOL/L (ref 4–13)
AST SERPL W P-5'-P-CCNC: 12 U/L (ref 5–45)
AST SERPL W P-5'-P-CCNC: 14 U/L (ref 15–41)
BASOPHILS # BLD AUTO: 0.03 THOUSANDS/ΜL (ref 0–0.1)
BASOPHILS NFR BLD AUTO: 0 % (ref 0–1)
BILIRUB SERPL-MCNC: 0.47 MG/DL (ref 0.3–1.2)
BILIRUB SERPL-MCNC: 0.58 MG/DL (ref 0.2–1)
BUN SERPL-MCNC: 8 MG/DL (ref 6–20)
BUN SERPL-MCNC: 9 MG/DL (ref 5–25)
CALCIUM SERPL-MCNC: 8.8 MG/DL (ref 8.3–10.1)
CALCIUM SERPL-MCNC: 9.2 MG/DL (ref 8.4–10.2)
CHLORIDE SERPL-SCNC: 107 MMOL/L (ref 96–108)
CHLORIDE SERPL-SCNC: 109 MMOL/L (ref 100–108)
CHOLEST SERPL-MCNC: 162 MG/DL (ref 50–200)
CO2 SERPL-SCNC: 25 MMOL/L (ref 22–33)
CO2 SERPL-SCNC: 26 MMOL/L (ref 21–32)
CREAT SERPL-MCNC: 0.69 MG/DL (ref 0.4–1.1)
CREAT SERPL-MCNC: 0.7 MG/DL (ref 0.6–1.3)
EOSINOPHIL # BLD AUTO: 0.2 THOUSAND/ΜL (ref 0–0.61)
EOSINOPHIL NFR BLD AUTO: 2 % (ref 0–6)
ERYTHROCYTE [DISTWIDTH] IN BLOOD BY AUTOMATED COUNT: 12.3 % (ref 11.6–15.1)
GFR SERPL CREATININE-BSD FRML MDRD: 102 ML/MIN/1.73SQ M
GFR SERPL CREATININE-BSD FRML MDRD: 103 ML/MIN/1.73SQ M
GLUCOSE P FAST SERPL-MCNC: 91 MG/DL (ref 65–99)
GLUCOSE SERPL-MCNC: 92 MG/DL (ref 65–140)
HCG SERPL QL: NEGATIVE
HCT VFR BLD AUTO: 38 % (ref 34.8–46.1)
HDLC SERPL-MCNC: 68 MG/DL
HGB BLD-MCNC: 13.2 G/DL (ref 11.5–15.4)
IMM GRANULOCYTES # BLD AUTO: 0.01 THOUSAND/UL (ref 0–0.2)
IMM GRANULOCYTES NFR BLD AUTO: 0 % (ref 0–2)
LDLC SERPL CALC-MCNC: 84 MG/DL (ref 0–100)
LYMPHOCYTES # BLD AUTO: 2.87 THOUSANDS/ΜL (ref 0.6–4.47)
LYMPHOCYTES NFR BLD AUTO: 27 % (ref 14–44)
MCH RBC QN AUTO: 29.7 PG (ref 26.8–34.3)
MCHC RBC AUTO-ENTMCNC: 34.7 G/DL (ref 31.4–37.4)
MCV RBC AUTO: 86 FL (ref 82–98)
MONOCYTES # BLD AUTO: 0.69 THOUSAND/ΜL (ref 0.17–1.22)
MONOCYTES NFR BLD AUTO: 7 % (ref 4–12)
NEUTROPHILS # BLD AUTO: 6.83 THOUSANDS/ΜL (ref 1.85–7.62)
NEUTS SEG NFR BLD AUTO: 64 % (ref 43–75)
PLATELET # BLD AUTO: 340 THOUSANDS/UL (ref 149–390)
PMV BLD AUTO: 10.1 FL (ref 8.9–12.7)
POTASSIUM SERPL-SCNC: 4.3 MMOL/L (ref 3.5–5)
POTASSIUM SERPL-SCNC: 4.3 MMOL/L (ref 3.5–5.3)
PROT SERPL-MCNC: 7.8 G/DL (ref 6.4–8.2)
PROT SERPL-MCNC: 7.8 G/DL (ref 6.4–8.3)
RBC # BLD AUTO: 4.44 MILLION/UL (ref 3.81–5.12)
SODIUM SERPL-SCNC: 138 MMOL/L (ref 133–145)
SODIUM SERPL-SCNC: 139 MMOL/L (ref 136–145)
TRIGL SERPL-MCNC: 51 MG/DL
TROPONIN I SERPL-MCNC: <0.03 NG/ML (ref 0–0.07)
WBC # BLD AUTO: 10.63 THOUSAND/UL (ref 4.31–10.16)

## 2020-11-04 PROCEDURE — 84703 CHORIONIC GONADOTROPIN ASSAY: CPT | Performed by: EMERGENCY MEDICINE

## 2020-11-04 PROCEDURE — 99285 EMERGENCY DEPT VISIT HI MDM: CPT | Performed by: EMERGENCY MEDICINE

## 2020-11-04 PROCEDURE — 80053 COMPREHEN METABOLIC PANEL: CPT

## 2020-11-04 PROCEDURE — 36415 COLL VENOUS BLD VENIPUNCTURE: CPT

## 2020-11-04 PROCEDURE — 80053 COMPREHEN METABOLIC PANEL: CPT | Performed by: EMERGENCY MEDICINE

## 2020-11-04 PROCEDURE — 85025 COMPLETE CBC W/AUTO DIFF WBC: CPT | Performed by: EMERGENCY MEDICINE

## 2020-11-04 PROCEDURE — G1004 CDSM NDSC: HCPCS

## 2020-11-04 PROCEDURE — 71275 CT ANGIOGRAPHY CHEST: CPT

## 2020-11-04 PROCEDURE — 99285 EMERGENCY DEPT VISIT HI MDM: CPT

## 2020-11-04 PROCEDURE — 83036 HEMOGLOBIN GLYCOSYLATED A1C: CPT | Performed by: INTERNAL MEDICINE

## 2020-11-04 PROCEDURE — 84484 ASSAY OF TROPONIN QUANT: CPT | Performed by: EMERGENCY MEDICINE

## 2020-11-04 PROCEDURE — 84484 ASSAY OF TROPONIN QUANT: CPT | Performed by: INTERNAL MEDICINE

## 2020-11-04 PROCEDURE — 80061 LIPID PANEL: CPT

## 2020-11-04 RX ORDER — ASPIRIN 325 MG
325 TABLET ORAL ONCE
Status: COMPLETED | OUTPATIENT
Start: 2020-11-04 | End: 2020-11-04

## 2020-11-04 RX ORDER — LISINOPRIL 20 MG/1
20 TABLET ORAL DAILY
Status: DISCONTINUED | OUTPATIENT
Start: 2020-11-05 | End: 2020-11-05 | Stop reason: HOSPADM

## 2020-11-04 RX ORDER — MAGNESIUM HYDROXIDE/ALUMINUM HYDROXICE/SIMETHICONE 120; 1200; 1200 MG/30ML; MG/30ML; MG/30ML
30 SUSPENSION ORAL EVERY 6 HOURS PRN
Status: DISCONTINUED | OUTPATIENT
Start: 2020-11-04 | End: 2020-11-05 | Stop reason: HOSPADM

## 2020-11-04 RX ORDER — ACETAMINOPHEN 325 MG/1
650 TABLET ORAL EVERY 6 HOURS PRN
Status: DISCONTINUED | OUTPATIENT
Start: 2020-11-04 | End: 2020-11-05 | Stop reason: HOSPADM

## 2020-11-04 RX ADMIN — ASPIRIN 325 MG ORAL TABLET 325 MG: 325 PILL ORAL at 16:21

## 2020-11-04 RX ADMIN — IOHEXOL 100 ML: 350 INJECTION, SOLUTION INTRAVENOUS at 17:15

## 2020-11-05 VITALS
RESPIRATION RATE: 16 BRPM | OXYGEN SATURATION: 99 % | BODY MASS INDEX: 35.53 KG/M2 | SYSTOLIC BLOOD PRESSURE: 126 MMHG | TEMPERATURE: 97.2 F | HEART RATE: 73 BPM | DIASTOLIC BLOOD PRESSURE: 70 MMHG | HEIGHT: 64 IN

## 2020-11-05 LAB
ANION GAP SERPL CALCULATED.3IONS-SCNC: 5 MMOL/L (ref 4–13)
BASOPHILS # BLD AUTO: 0.03 THOUSANDS/ΜL (ref 0–0.1)
BASOPHILS NFR BLD AUTO: 0 % (ref 0–1)
BUN SERPL-MCNC: 10 MG/DL (ref 6–20)
CALCIUM SERPL-MCNC: 8.7 MG/DL (ref 8.4–10.2)
CHLORIDE SERPL-SCNC: 108 MMOL/L (ref 96–108)
CO2 SERPL-SCNC: 24 MMOL/L (ref 22–33)
CREAT SERPL-MCNC: 0.62 MG/DL (ref 0.4–1.1)
EOSINOPHIL # BLD AUTO: 0.22 THOUSAND/ΜL (ref 0–0.61)
EOSINOPHIL NFR BLD AUTO: 3 % (ref 0–6)
ERYTHROCYTE [DISTWIDTH] IN BLOOD BY AUTOMATED COUNT: 12.5 % (ref 11.6–15.1)
EST. AVERAGE GLUCOSE BLD GHB EST-MCNC: 105 MG/DL
GFR SERPL CREATININE-BSD FRML MDRD: 106 ML/MIN/1.73SQ M
GLUCOSE SERPL-MCNC: 105 MG/DL (ref 65–140)
HBA1C MFR BLD: 5.3 %
HCT VFR BLD AUTO: 36.8 % (ref 34.8–46.1)
HGB BLD-MCNC: 12.5 G/DL (ref 11.5–15.4)
IMM GRANULOCYTES # BLD AUTO: 0.01 THOUSAND/UL (ref 0–0.2)
IMM GRANULOCYTES NFR BLD AUTO: 0 % (ref 0–2)
LYMPHOCYTES # BLD AUTO: 2.32 THOUSANDS/ΜL (ref 0.6–4.47)
LYMPHOCYTES NFR BLD AUTO: 33 % (ref 14–44)
MCH RBC QN AUTO: 29.1 PG (ref 26.8–34.3)
MCHC RBC AUTO-ENTMCNC: 34 G/DL (ref 31.4–37.4)
MCV RBC AUTO: 86 FL (ref 82–98)
MONOCYTES # BLD AUTO: 0.6 THOUSAND/ΜL (ref 0.17–1.22)
MONOCYTES NFR BLD AUTO: 9 % (ref 4–12)
NEUTROPHILS # BLD AUTO: 3.89 THOUSANDS/ΜL (ref 1.85–7.62)
NEUTS SEG NFR BLD AUTO: 55 % (ref 43–75)
PLATELET # BLD AUTO: 300 THOUSANDS/UL (ref 149–390)
PMV BLD AUTO: 10 FL (ref 8.9–12.7)
POTASSIUM SERPL-SCNC: 4 MMOL/L (ref 3.5–5)
RBC # BLD AUTO: 4.3 MILLION/UL (ref 3.81–5.12)
SODIUM SERPL-SCNC: 137 MMOL/L (ref 133–145)
TSH SERPL DL<=0.05 MIU/L-ACNC: 1.4 UIU/ML (ref 0.34–5.6)
WBC # BLD AUTO: 7.07 THOUSAND/UL (ref 4.31–10.16)

## 2020-11-05 PROCEDURE — 84443 ASSAY THYROID STIM HORMONE: CPT | Performed by: INTERNAL MEDICINE

## 2020-11-05 PROCEDURE — 80048 BASIC METABOLIC PNL TOTAL CA: CPT | Performed by: INTERNAL MEDICINE

## 2020-11-05 PROCEDURE — 85025 COMPLETE CBC W/AUTO DIFF WBC: CPT | Performed by: INTERNAL MEDICINE

## 2020-11-05 RX ADMIN — ENOXAPARIN SODIUM 40 MG: 40 INJECTION SUBCUTANEOUS at 08:17

## 2020-11-09 ENCOUNTER — TRANSITIONAL CARE MANAGEMENT (OUTPATIENT)
Dept: FAMILY MEDICINE CLINIC | Facility: CLINIC | Age: 50
End: 2020-11-09

## 2020-11-12 ENCOUNTER — TELEPHONE (OUTPATIENT)
Dept: FAMILY MEDICINE CLINIC | Facility: CLINIC | Age: 50
End: 2020-11-12

## 2020-11-17 ENCOUNTER — OFFICE VISIT (OUTPATIENT)
Dept: FAMILY MEDICINE CLINIC | Facility: CLINIC | Age: 50
End: 2020-11-17
Payer: COMMERCIAL

## 2020-11-17 VITALS
TEMPERATURE: 97.2 F | WEIGHT: 206 LBS | OXYGEN SATURATION: 98 % | HEART RATE: 56 BPM | HEIGHT: 64 IN | RESPIRATION RATE: 16 BRPM | DIASTOLIC BLOOD PRESSURE: 80 MMHG | SYSTOLIC BLOOD PRESSURE: 120 MMHG | BODY MASS INDEX: 35.17 KG/M2

## 2020-11-17 DIAGNOSIS — R07.89 COSTOCHONDRAL CHEST PAIN: Primary | ICD-10-CM

## 2020-11-17 DIAGNOSIS — G47.9 SLEEP DIFFICULTIES: ICD-10-CM

## 2020-11-17 DIAGNOSIS — M94.0 COSTOCHONDRITIS: ICD-10-CM

## 2020-11-17 DIAGNOSIS — R07.82 INTERCOSTAL PAIN: ICD-10-CM

## 2020-11-17 DIAGNOSIS — I10 ESSENTIAL HYPERTENSION: ICD-10-CM

## 2020-11-17 DIAGNOSIS — M54.2 NECK PAIN: ICD-10-CM

## 2020-11-17 DIAGNOSIS — E78.49 OTHER HYPERLIPIDEMIA: ICD-10-CM

## 2020-11-17 PROCEDURE — 3008F BODY MASS INDEX DOCD: CPT | Performed by: FAMILY MEDICINE

## 2020-11-17 PROCEDURE — 1111F DSCHRG MED/CURRENT MED MERGE: CPT | Performed by: FAMILY MEDICINE

## 2020-11-17 PROCEDURE — 99495 TRANSJ CARE MGMT MOD F2F 14D: CPT | Performed by: FAMILY MEDICINE

## 2020-11-17 RX ORDER — CYCLOBENZAPRINE HCL 10 MG
10 TABLET ORAL
Qty: 30 TABLET | Refills: 1 | Status: SHIPPED | OUTPATIENT
Start: 2020-11-17 | End: 2021-02-02

## 2020-11-17 RX ORDER — NAPROXEN 500 MG/1
500 TABLET ORAL 2 TIMES DAILY WITH MEALS
Qty: 60 TABLET | Refills: 0 | Status: SHIPPED | OUTPATIENT
Start: 2020-11-17 | End: 2020-12-15

## 2020-11-18 ENCOUNTER — TELEPHONE (OUTPATIENT)
Dept: SLEEP CENTER | Facility: CLINIC | Age: 50
End: 2020-11-18

## 2020-11-18 DIAGNOSIS — G47.30 SLEEP APNEA IN ADULT: Primary | ICD-10-CM

## 2020-12-15 DIAGNOSIS — M94.0 COSTOCHONDRITIS: ICD-10-CM

## 2020-12-15 RX ORDER — NAPROXEN 500 MG/1
TABLET ORAL
Qty: 60 TABLET | Refills: 0 | Status: SHIPPED | OUTPATIENT
Start: 2020-12-15 | End: 2021-02-23 | Stop reason: ALTCHOICE

## 2020-12-16 ENCOUNTER — TELEPHONE (OUTPATIENT)
Dept: SLEEP CENTER | Facility: CLINIC | Age: 50
End: 2020-12-16

## 2021-01-19 ENCOUNTER — OFFICE VISIT (OUTPATIENT)
Dept: FAMILY MEDICINE CLINIC | Facility: CLINIC | Age: 51
End: 2021-01-19
Payer: COMMERCIAL

## 2021-01-19 VITALS — HEIGHT: 64 IN | BODY MASS INDEX: 33.8 KG/M2 | WEIGHT: 198 LBS

## 2021-01-19 DIAGNOSIS — U07.1 ACUTE RESPIRATORY DISEASE DUE TO COVID-19 VIRUS: Primary | ICD-10-CM

## 2021-01-19 DIAGNOSIS — R05.9 COUGH: ICD-10-CM

## 2021-01-19 DIAGNOSIS — J06.9 ACUTE RESPIRATORY DISEASE DUE TO COVID-19 VIRUS: Primary | ICD-10-CM

## 2021-01-19 PROCEDURE — 1036F TOBACCO NON-USER: CPT | Performed by: FAMILY MEDICINE

## 2021-01-19 PROCEDURE — 99214 OFFICE O/P EST MOD 30 MIN: CPT | Performed by: FAMILY MEDICINE

## 2021-01-19 PROCEDURE — 3008F BODY MASS INDEX DOCD: CPT | Performed by: FAMILY MEDICINE

## 2021-01-19 RX ORDER — BENZONATATE 200 MG/1
200 CAPSULE ORAL 3 TIMES DAILY PRN
Qty: 20 CAPSULE | Refills: 0 | Status: SHIPPED | OUTPATIENT
Start: 2021-01-19 | End: 2021-02-23 | Stop reason: ALTCHOICE

## 2021-01-19 NOTE — PROGRESS NOTES
COVID-19 Virtual Visit     Assessment/Plan:    Problem List Items Addressed This Visit        Respiratory    Acute respiratory disease due to COVID-19 virus - Primary       She is day 5 since symptoms started  Respiratory status stable  Discussed about increase oral hydration take vitamin-C and D  He is humidifier at home  Take Tylenol ibuprofen  If symptoms worse call back  I will follow-up with her next week  Other    Cough      Was given prescription for Tessalon Perles  Increase oral hydration  Relevant Medications    benzonatate (TESSALON) 200 MG capsule         Disposition:     I recommended continued isolation until at least 24 hours have passed since recovery defined as resolution of fever without the use of fever-reducing medications AND improvement in COVID symptoms AND 10 days have passed since onset of symptoms (or 10 days have passed since date of first positive viral diagnostic test for asymptomatic patients)  I have spent 15 minutes directly with the patient  Greater than 50% of this time was spent in counseling/coordination of care regarding: risks and benefits of treatment options, instructions for management and patient and family education  Encounter provider Kyle Asencio MD    Provider located at [address] [place] [address],[address] [id number]    Recent Visits  No visits were found meeting these conditions  Showing recent visits within past 7 days and meeting all other requirements     Today's Visits  Date Type Provider Dept   01/19/21 Office Visit Kyle Asencio MD [facility]   Showing today's visits and meeting all other requirements     Future Appointments  No visits were found meeting these conditions     Showing future appointments within next 150 days and meeting all other requirements      This virtual check-in was done via [employer] and patient was informed that this is not a secure, HIPAA-compliant platform  She agrees to proceed  Patient agrees to participate in a virtual check in via telephone or video visit instead of presenting to the office to address urgent/immediate medical needs  Patient is aware this is a billable service  After connecting through [facility], the patient was identified by name and date of birth  Michelle Tobar was informed that this was a telemedicine visit and that the exam was being conducted confidentially over secure lines  My office door was closed  Michelle Tobar acknowledged consent and understanding of privacy and security of the telemedicine visit  I informed the patient that I have reviewed her record in Epic and presented the opportunity for her to ask any questions regarding the visit today  The patient agreed to participate  Subjective:   Michelle Tobar is a 53 y o  female who has been screened for COVID-19  Symptom change since last report: improving  Patient's symptoms include cough  Patient denies fever, chills, shortness of breath, abdominal pain, diarrhea and headaches  Lila Warner has been staying home and has isolated themselves in her home  She is taking care to not share personal items and is cleaning all surfaces that are touched often, like counters, tabletops, and doorknobs using household cleaning sprays or wipes  She is wearing a mask when she leaves her room       Date of symptom onset: 1/15/2021  Date of positive COVID-19 PCR: 1/16/2021    Lab Results   Component Value Date    SARSCORONAVI Not Detected 12/29/2020     Past Medical History:   Diagnosis Date    Hypertension      Past Surgical History:   Procedure Laterality Date    KNEE SURGERY      OTHER SURGICAL HISTORY      right surgery      Current Outpatient Medications   Medication Sig Dispense Refill    acetaminophen (TYLENOL) 500 mg tablet Take 500 mg by mouth every 6 (six) hours as needed for mild pain      cyclobenzaprine (FLEXERIL) 10 mg tablet Take 1 tablet (10 mg total) by mouth daily at bedtime 30 tablet 1    famotidine (PEPCID) 20 mg tablet Take 1 tablet daily as needed for reflux symptoms 90 tablet 1    ibuprofen (MOTRIN) 600 mg tablet Take 1 tablet every 6 hours as needed for headache with food, no alcohol 40 tablet 1    lisinopril (ZESTRIL) 20 mg tablet Take 1 tablet (20 mg total) by mouth daily 30 tablet 3    naproxen (NAPROSYN) 500 mg tablet TAKE 1 TABLET BY MOUTH TWICE A DAY WITH MEALS 60 tablet 0    benzonatate (TESSALON) 200 MG capsule Take 1 capsule (200 mg total) by mouth 3 (three) times a day as needed for cough 20 capsule 0     No current facility-administered medications for this visit  No Known Allergies    Review of Systems   Constitutional: Negative for chills and fever  HENT: Negative for trouble swallowing  Eyes: Negative for visual disturbance  Respiratory: Positive for cough  Negative for shortness of breath  Cardiovascular: Negative for chest pain, palpitations and leg swelling  Gastrointestinal: Negative for abdominal pain, constipation and diarrhea  Endocrine: Negative for cold intolerance and heat intolerance  Genitourinary: Negative for difficulty urinating and dysuria  Musculoskeletal: Negative for gait problem  Skin: Negative for rash  Neurological: Negative for dizziness, tremors, seizures and headaches  Hematological: Negative for adenopathy  Psychiatric/Behavioral: Negative for behavioral problems  Objective:    Vitals:    01/19/21 1440   Weight: 89 8 kg (198 lb)   Height: 5' 4" (1 626 m)       Physical Exam  Vitals signs reviewed  Constitutional:       Appearance: Normal appearance  HENT:      Head: Normocephalic and atraumatic  Mouth/Throat:      Pharynx: Oropharynx is clear  Eyes:      Conjunctiva/sclera: Conjunctivae normal    Pulmonary:      Effort: No respiratory distress  Musculoskeletal: Normal range of motion     Skin: Findings: No rash  Neurological:      Mental Status: She is alert  Mental status is at baseline  Psychiatric:         Mood and Affect: Mood normal        VIRTUAL VISIT DISCLAIMER    Pavel Brooke acknowledges that she has consented to an online visit or consultation  She understands that the online visit is based solely on information provided by her, and that, in the absence of a face-to-face physical evaluation by the physician, the diagnosis she receives is both limited and provisional in terms of accuracy and completeness  This is not intended to replace a full medical face-to-face evaluation by the physician  Pavel Brooke understands and accepts these terms

## 2021-01-19 NOTE — ASSESSMENT & PLAN NOTE
She is day 5 since symptoms started  Respiratory status stable  Discussed about increase oral hydration take vitamin-C and D  He is humidifier at home  Take Tylenol ibuprofen  If symptoms worse call back  I will follow-up with her next week

## 2021-02-02 DIAGNOSIS — M54.2 NECK PAIN: ICD-10-CM

## 2021-02-02 RX ORDER — CYCLOBENZAPRINE HCL 10 MG
TABLET ORAL
Qty: 30 TABLET | Refills: 1 | Status: SHIPPED | OUTPATIENT
Start: 2021-02-02 | End: 2021-02-23 | Stop reason: ALTCHOICE

## 2021-02-08 ENCOUNTER — TELEPHONE (OUTPATIENT)
Dept: FAMILY MEDICINE CLINIC | Facility: CLINIC | Age: 51
End: 2021-02-08

## 2021-02-10 DIAGNOSIS — I10 ESSENTIAL HYPERTENSION: ICD-10-CM

## 2021-02-10 NOTE — TELEPHONE ENCOUNTER
The patient has been dismissed from [facility] since 2/09/21 for the following reason: Non-Compliant  Per Mejia Son okay to send refill request to provider

## 2021-02-11 RX ORDER — LISINOPRIL 20 MG/1
20 TABLET ORAL DAILY
Qty: 30 TABLET | Refills: 0 | Status: SHIPPED | OUTPATIENT
Start: 2021-02-11 | End: 2021-05-19 | Stop reason: SDUPTHER

## 2021-02-22 PROBLEM — Z00.00 HEALTHCARE MAINTENANCE: Status: RESOLVED | Noted: 2020-10-27 | Resolved: 2021-02-22

## 2021-02-22 PROBLEM — Z13.220 LIPID SCREENING: Status: RESOLVED | Noted: 2019-07-09 | Resolved: 2021-02-22

## 2021-02-22 PROBLEM — R07.9 CHEST PAIN: Status: RESOLVED | Noted: 2020-11-04 | Resolved: 2021-02-22

## 2021-02-22 PROBLEM — R05.9 COUGH: Status: RESOLVED | Noted: 2021-01-19 | Resolved: 2021-02-22

## 2021-02-22 NOTE — ASSESSMENT & PLAN NOTE
BP Readings from Last 3 Encounters:   02/23/21 126/70   11/17/20 120/80   11/05/20 126/70     Controlled, continue lisinopril 20 mg

## 2021-02-22 NOTE — PROGRESS NOTES
FAMILY MEDICINE NEW PATIENT     Date of Service: 21  Primary Care Provider:   Robert Girard MD     Name: Ivelisse Johnson       : 1970       Age:50 y o  Sex: female      MRN: [id number]      Chief Complaint:New Patient Visit       ASSESSMENT and PLAN:  Ivelisse Johnson is a 53 y o  female here to establish care with:     Problem List Items Addressed This Visit        Digestive    GERD (gastroesophageal reflux disease)     Was taking PPI, intermittently  Recommend Pepcid as needed  Possibly contributing to burning chest pain  Cardiovascular and Mediastinum    Hypertension - Primary     BP Readings from Last 3 Encounters:   21 126/70   20 120/80   20 126/70     Controlled, continue lisinopril 20 mg          Relevant Medications    Blood Pressure Monitoring (Blood Pressure Cuff) MISC       Nervous and Auditory    Peripheral positional vertigo     Symptoms of dizziness occur with head position changes, likely BPPV  Reassured patient, consider vestibular rehab  Other    Chest wall pain     Patient with tenderness to palpation over chest wall, pectoralis muscles  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  Chest pain is not relate to exertion, no associated symptoms  Recommended and demonstrated stretches for patient to do at home  SUBJECTIVE:  Ivelisse Johnson is a 53 y o  female who presents today with a chief complaint of New Patient Visit  HPI     She had COVID at the end of January  She is overall feeling better, she still has some fatigue, cough has improved  Today she is concerned about burning sensation in her chest, started before COVID over a month ago  She reports that it happens around bedtime though has the pain currently  She reports that the pain comes out of no where  She has not been taking any medications  She sometimes has an upset stomach, nausea  She denies shortness of breath   She reports occasional dizziness while at work, when bending over or turning her head  She was hospitalized in November for atypical chest pain, normal EKG  Chest pain attributed to GERD, chest wall pain  She was treated by previous PCP for costochondritis  Review of Systems   Eyes: Negative for visual disturbance  Respiratory: Negative for shortness of breath  Gastrointestinal: Positive for abdominal pain and nausea  Negative for constipation, diarrhea and vomiting  Genitourinary: Positive for menstrual problem (menopause)  Musculoskeletal: Positive for arthralgias  Neurological: Positive for dizziness  Negative for light-headedness  Psychiatric/Behavioral: The patient is nervous/anxious  I have reviewed the patient's PMH, Surgical History, Family History, Social History, Medication List and Allergies        Histories Reviewed and Updated 2/23/2021:  Patient's Medications   New Prescriptions    BLOOD PRESSURE MONITORING (BLOOD PRESSURE CUFF) MISC    Use daily   Previous Medications    ACETAMINOPHEN (TYLENOL) 500 MG TABLET    Take 500 mg by mouth every 6 (six) hours as needed for mild pain    LISINOPRIL (ZESTRIL) 20 MG TABLET    Take 1 tablet (20 mg total) by mouth daily   Modified Medications    No medications on file   Discontinued Medications    BENZONATATE (TESSALON) 200 MG CAPSULE    Take 1 capsule (200 mg total) by mouth 3 (three) times a day as needed for cough    CYCLOBENZAPRINE (FLEXERIL) 10 MG TABLET    TAKE 1 TABLET BY MOUTH DAILY AT BEDTIME    FAMOTIDINE (PEPCID) 20 MG TABLET    Take 1 tablet daily as needed for reflux symptoms    IBUPROFEN (MOTRIN) 600 MG TABLET    Take 1 tablet every 6 hours as needed for headache with food, no alcohol    NAPROXEN (NAPROSYN) 500 MG TABLET    TAKE 1 TABLET BY MOUTH TWICE A DAY WITH MEALS     No Known Allergies  Past Medical History:   Diagnosis Date    Hypertension      Past Surgical History:   Procedure Laterality Date    KNEE SURGERY      OTHER SURGICAL HISTORY      right surgery Social History     Socioeconomic History    Marital status: Single     Spouse name: Not on file    Number of children: Not on file    Years of education: Not on file    Highest education level: Not on file   Occupational History    Not on file   Social Needs    Financial resource strain: Not on file    Food insecurity     Worry: Not on file     Inability: Not on file    Transportation needs     Medical: Not on file     Non-medical: Not on file   Tobacco Use    Smoking status: Never Smoker    Smokeless tobacco: Never Used   Substance and Sexual Activity    Alcohol use: Never     Frequency: Never    Drug use: Never    Sexual activity: Not Currently     Partners: Male     Birth control/protection: None   Lifestyle    Physical activity     Days per week: Not on file     Minutes per session: Not on file    Stress: Not on file   Relationships    Social connections     Talks on phone: Not on file     Gets together: Not on file     Attends [employer] service: Not on file     Active member of club or organization: Not on file     Attends meetings of clubs or organizations: Not on file     Relationship status: Not on file    Intimate partner violence     Fear of current or ex partner: Not on file     Emotionally abused: Not on file     Physically abused: Not on file     Forced sexual activity: Not on file   Other Topics Concern    Not on file   Social History Narrative    Not on file     Family History   Problem Relation Age of Onset    No Known Problems Mother     No Known Problems Father     No Known Problems Sister     No Known Problems Maternal Grandmother     No Known Problems Maternal Grandfather     No Known Problems Paternal Grandmother     No Known Problems Paternal Grandfather     No Known Problems Sister     No Known Problems Sister     No Known Problems Maternal Aunt     No Known Problems Maternal Aunt      There is no immunization history for the selected administration types on file for this patient  OBJECTIVE:  /70   Pulse 74   Temp 97 6 °F (36 4 °C)   Resp 14   Ht 5' 4" (1 626 m)   Wt 93 kg (205 lb)   LMP 11/23/2020 (Approximate)   BMI 35 19 kg/m²   BP Readings from Last 3 Encounters:   02/23/21 126/70   11/17/20 120/80   11/05/20 126/70      Wt Readings from Last 3 Encounters:   02/23/21 93 kg (205 lb)   01/19/21 89 8 kg (198 lb)   11/17/20 93 4 kg (206 lb)      Physical Exam  Constitutional:       General: She is not in acute distress  Appearance: Normal appearance  She is normal weight  She is not ill-appearing or diaphoretic  HENT:      Head: Normocephalic and atraumatic  Right Ear: External ear normal       Left Ear: External ear normal       Nose: Nose normal       Mouth/Throat:      Mouth: Mucous membranes are moist    Eyes:      Extraocular Movements: Extraocular movements intact  Conjunctiva/sclera: Conjunctivae normal    Neck:      Musculoskeletal: Normal range of motion and neck supple  No neck rigidity or muscular tenderness  Vascular: No carotid bruit  Cardiovascular:      Rate and Rhythm: Normal rate and regular rhythm  Pulses: Normal pulses  Heart sounds: Normal heart sounds  Pulmonary:      Effort: Pulmonary effort is normal  No respiratory distress  Breath sounds: Normal breath sounds  Chest:      Chest wall: Tenderness (over right sided chest wall ) present  Abdominal:      General: There is no distension  Musculoskeletal: Normal range of motion  Right lower leg: No edema  Left lower leg: No edema  Lymphadenopathy:      Cervical: No cervical adenopathy  Skin:     Findings: No erythema or rash  Neurological:      General: No focal deficit present  Mental Status: She is alert        Gait: Gait normal    Psychiatric:         Mood and Affect: Mood normal          Behavior: Behavior normal          Lab Results   Component Value Date    WBC 7 07 11/05/2020    HGB 12 5 11/05/2020    HCT 36 8 11/05/2020    MCV 86 11/05/2020     11/05/2020     Lab Results   Component Value Date    SODIUM 137 11/05/2020    K 4 0 11/05/2020     11/05/2020    CO2 24 11/05/2020    BUN 10 11/05/2020    CREATININE 0 62 11/05/2020    GLUC 105 11/05/2020    CALCIUM 8 7 11/05/2020     Lab Results   Component Value Date    CHOLESTEROL 162 11/04/2020    CHOLESTEROL 194 06/25/2020    CHOLESTEROL 156 07/16/2019     Lab Results   Component Value Date    HDL 68 11/04/2020    HDL 58 06/25/2020    HDL 63 (H) 07/16/2019     Lab Results   Component Value Date    TRIG 51 11/04/2020    TRIG 70 06/25/2020    TRIG 51 07/16/2019     Lab Results   Component Value Date    [place] 93 07/16/2019     Lab Results   Component Value Date    LDLCALC 84 11/04/2020              Alex Benavides MD

## 2021-02-23 ENCOUNTER — OFFICE VISIT (OUTPATIENT)
Dept: FAMILY MEDICINE CLINIC | Facility: CLINIC | Age: 51
End: 2021-02-23
Payer: COMMERCIAL

## 2021-02-23 VITALS
DIASTOLIC BLOOD PRESSURE: 70 MMHG | RESPIRATION RATE: 14 BRPM | BODY MASS INDEX: 35 KG/M2 | HEIGHT: 64 IN | SYSTOLIC BLOOD PRESSURE: 126 MMHG | WEIGHT: 205 LBS | HEART RATE: 74 BPM | TEMPERATURE: 97.6 F

## 2021-02-23 DIAGNOSIS — I10 ESSENTIAL HYPERTENSION: Primary | ICD-10-CM

## 2021-02-23 DIAGNOSIS — H81.399 PERIPHERAL POSITIONAL VERTIGO, UNSPECIFIED LATERALITY: ICD-10-CM

## 2021-02-23 DIAGNOSIS — R07.89 CHEST WALL PAIN: ICD-10-CM

## 2021-02-23 DIAGNOSIS — K21.9 GASTROESOPHAGEAL REFLUX DISEASE WITHOUT ESOPHAGITIS: ICD-10-CM

## 2021-02-23 PROBLEM — E78.49 OTHER HYPERLIPIDEMIA: Status: RESOLVED | Noted: 2020-07-22 | Resolved: 2021-02-23

## 2021-02-23 PROCEDURE — 3078F DIAST BP <80 MM HG: CPT | Performed by: FAMILY MEDICINE

## 2021-02-23 PROCEDURE — 1036F TOBACCO NON-USER: CPT | Performed by: FAMILY MEDICINE

## 2021-02-23 PROCEDURE — 3008F BODY MASS INDEX DOCD: CPT | Performed by: FAMILY MEDICINE

## 2021-02-23 PROCEDURE — 3074F SYST BP LT 130 MM HG: CPT | Performed by: FAMILY MEDICINE

## 2021-02-23 PROCEDURE — 99214 OFFICE O/P EST MOD 30 MIN: CPT | Performed by: FAMILY MEDICINE

## 2021-02-23 RX ORDER — ADHESIVE BANDAGE 3/4"
BANDAGE TOPICAL DAILY
Qty: 1 EACH | Refills: 0 | Status: SHIPPED | OUTPATIENT
Start: 2021-02-23 | End: 2022-08-03 | Stop reason: ALTCHOICE

## 2021-02-23 NOTE — ASSESSMENT & PLAN NOTE
Symptoms of dizziness occur with head position changes, likely BPPV  Reassured patient, consider vestibular rehab

## 2021-02-23 NOTE — ASSESSMENT & PLAN NOTE
Was taking PPI, intermittently  Recommend Pepcid as needed  Possibly contributing to burning chest pain

## 2021-02-23 NOTE — ASSESSMENT & PLAN NOTE
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

## 2021-02-23 NOTE — PATIENT INSTRUCTIONS
Dolor en la pared torácica   LO QUE NECESITA SABER:   El dolor de la pared torácica puede ser causado por problemas con los músculos, cartílago o huesos de la pared torácica  El dolor de la pared torácica también puede ser causado por dolor que se propaga a hardin pecho y que proviene de otra parte de hardin cuerpo  El dolor puede ser persistente, severo, leve o dennis  Puede ser intermitente (va y viene) o puede ser persistente  El dolor también puede ser peor cuando usted se mueve de ciertas formas, respira profundo o tose  INSTRUCCIONES SOBRE EL DION HOSPITALARIA:   Llame al 911 si:  · Tiene alguno de los siguientes signos de un ataque cardíaco:      ? Estrujamiento, presión o tensión en hardin pecho    ? Usted también podría presentar alguno de los siguientes:     § Malestar o dolor en hardin espalda, andrew, mandíbula, abdomen, o brazo    § Falta de [employer] o vómitos    § Desvanecimiento o sudor frío repentino      Regrese a la callum de emergencias si:  · Usted tiene dolor intenso  Comuníquese con hardin médico si:  · Usted desarrolla un sarpullido  · Usted tiene otros síntomas nuevos  · Hardin dolor no mejora, aún con tratamiento  · Usted tiene preguntas o inquietudes acerca de hardin condición o cuidado  Medicamentos: Es posible que usted necesite alguno de los siguientes:  · Los [place]mit[place], manish el ibuprofeno, [facility] a disminuir la inflamación, el dolor y la [place]  Tresa medicamento está disponible con o sin kika receta médica  Los MEENAKSHI pueden causar sangrado estomacal o problemas renales en ciertas personas  Si usted keturah un medicamento anticoagulante, siempre pregúntele a hardin médico si los MEENAKSHI son seguros para usted  Siempre richi la etiqueta de tresa medicamento y [place] instrucciones  · Acetaminofén [employer]  Está disponible sin receta médica  Pregunte la cantidad y la frecuencia con que debe tomarlos  [address]   El acetaminofén puede causar daño en el hígado cuando no se keturah de forma correcta  · Kika crema se puede aplicar en hardin pecho para aliviar el dolor  · [place] gatito medicamentos manish se le haya indicado  Consulte con hardin médico si usted candida que hardin medicamento no le está ayudando o si presenta efectos secundarios  Infórmele si es alérgico a algún medicamento  Mantenga kika lista actualizada de los [place], las vitaminas y los productos herbales que keturah  Incluya los siguientes datos de los medicamentos: cantidad, frecuencia y motivo de administración  Traiga con usted la lista o los envases de las píldoras a gatito citas de seguimiento  Lleve la lista de los medicamentos con usted en michelle de kika emergencia  Acuda a gatito consultas de control con hardin médico según le indicaron  Anote gatito preguntas para que se acuerde de hacerlas jackie gatito visitas  Cuidados personales:  · El descanso tanto manish sea necesario  Evite las actividades que le empeoren el dolor de la pared torácica  · La aplicación de calor a hardin pecho de 20 a 30 minutos cada 2 horas por los [employer] indiquen  El calor ayuda a disminuir el dolor y los espasmos musculares  · Aplique hielo a hardin pecho de 15 a 20 minutos cada hora según indicaciones  Use ikka compresa de hielo o ponga hielo triturado en kika bolsa de plástico  Gunjan [place] con kika toalla  El hielo ayuda a evitar daño al tejido y a disminuir la inflamación y el dolor  © Copyright [employer] Information is for End User's use only and may not be sold, redistributed or otherwise used for commercial purposes  All illustrations and images included in CareNotes® are the copyrighted property of [employer]  or [address] [place] [address] es sólo para uso en educación  Hardin intención no es darle un consejo médico sobre enfermedades o tratamientos  Colsulte con hardin Sangeeta Gal farmacéutico antes de seguir cualquier régimen médico para saber si es seguro y efectivo para usted

## 2021-03-10 NOTE — ED NOTES
Dr Catrachita Childs to bedside for evaluation        Jatinder Saxena RN  05/29/18 [id number]
Dr Whitney Anders to bedside for evaluation        Renate Veliz, RN  05/29/18 [id number]
Family at bedside  Pt  And family requesting for pain medication  Dr Rosamaria Kinney made aware  Patient is to await attending evaluation for pain medication  Pt  And family made aware  Verbalize understanding        Sourav Diallo RN  05/29/18 [id number]
Pt  And family upset  Requesting for pain medication  RN spoke to Dr Jailyn Bass  Patient is next to be seen        Carry ZIGGY Marino  05/29/18 2345
Pt  Reports, "she was at the dentist for the pain, and he told her she had an infection "  Pt  Started on PO amoxicillin and ibuprofen without relief  Pt  Also reports hx of hypertension in the past   Has not taken blood pressure medications for the past 2 years   Reports, "It's high because of my pain "        Kylie Simpson, RN  05/29/18 6252
NONE

## 2021-05-14 DIAGNOSIS — I10 ESSENTIAL HYPERTENSION: ICD-10-CM

## 2021-05-14 RX ORDER — LISINOPRIL 20 MG/1
TABLET ORAL
Qty: 30 TABLET | Refills: 0 | OUTPATIENT
Start: 2021-05-14

## 2021-05-19 ENCOUNTER — OFFICE VISIT (OUTPATIENT)
Dept: FAMILY MEDICINE CLINIC | Facility: CLINIC | Age: 51
End: 2021-05-19
Payer: COMMERCIAL

## 2021-05-19 VITALS
BODY MASS INDEX: 34.66 KG/M2 | SYSTOLIC BLOOD PRESSURE: 128 MMHG | WEIGHT: 203 LBS | HEIGHT: 64 IN | HEART RATE: 60 BPM | OXYGEN SATURATION: 98 % | DIASTOLIC BLOOD PRESSURE: 80 MMHG | TEMPERATURE: 98 F | RESPIRATION RATE: 14 BRPM

## 2021-05-19 DIAGNOSIS — H10.13 ALLERGIC CONJUNCTIVITIS OF BOTH EYES: ICD-10-CM

## 2021-05-19 DIAGNOSIS — Z12.11 COLON CANCER SCREENING: ICD-10-CM

## 2021-05-19 DIAGNOSIS — J30.2 SEASONAL ALLERGIES: Primary | ICD-10-CM

## 2021-05-19 DIAGNOSIS — I10 ESSENTIAL HYPERTENSION: ICD-10-CM

## 2021-05-19 PROCEDURE — 99213 OFFICE O/P EST LOW 20 MIN: CPT | Performed by: FAMILY MEDICINE

## 2021-05-19 PROCEDURE — 1036F TOBACCO NON-USER: CPT | Performed by: FAMILY MEDICINE

## 2021-05-19 PROCEDURE — 3008F BODY MASS INDEX DOCD: CPT | Performed by: FAMILY MEDICINE

## 2021-05-19 PROCEDURE — 3079F DIAST BP 80-89 MM HG: CPT | Performed by: FAMILY MEDICINE

## 2021-05-19 PROCEDURE — 3074F SYST BP LT 130 MM HG: CPT | Performed by: FAMILY MEDICINE

## 2021-05-19 RX ORDER — LISINOPRIL 20 MG/1
20 TABLET ORAL DAILY
Qty: 90 TABLET | Refills: 1 | Status: SHIPPED | OUTPATIENT
Start: 2021-05-19 | End: 2021-08-11 | Stop reason: SDUPTHER

## 2021-05-19 RX ORDER — FLUTICASONE PROPIONATE 50 MCG
2 SPRAY, SUSPENSION (ML) NASAL DAILY
Qty: 16 G | Refills: 1 | Status: SHIPPED | OUTPATIENT
Start: 2021-05-19 | End: 2022-05-31 | Stop reason: SDUPTHER

## 2021-05-19 RX ORDER — LORATADINE 10 MG/1
10 TABLET ORAL DAILY
Qty: 90 TABLET | Refills: 1 | Status: SHIPPED | OUTPATIENT
Start: 2021-05-19 | End: 2022-05-31 | Stop reason: SDUPTHER

## 2021-05-19 RX ORDER — CETIRIZINE HYDROCHLORIDE 10 MG/1
10 TABLET ORAL DAILY
COMMUNITY
End: 2021-05-19

## 2021-05-19 RX ORDER — OLOPATADINE HYDROCHLORIDE 1 MG/ML
1 SOLUTION/ DROPS OPHTHALMIC 2 TIMES DAILY
Qty: 5 ML | Refills: 0 | Status: SHIPPED | OUTPATIENT
Start: 2021-05-19

## 2021-05-19 NOTE — ASSESSMENT & PLAN NOTE
BP Readings from Last 3 Encounters:   05/19/21 128/80   02/23/21 126/70   11/17/20 120/80     Controlled, continue lisinopril 20 mg

## 2021-05-19 NOTE — PROGRESS NOTES
FAMILY MEDICINE PROGRESS NOTE    Date of Service: 21  Primary Care Provider:   Jennifer García MD       Name: Antwon Lee       : 1970       Age:51 y o  Sex: female      MRN: [id number]      Chief Complaint:Follow-up (allergies)       ASSESSMENT and PLAN:  Antwon Lee is a 49 y o  female with:     Problem List Items Addressed This Visit        Cardiovascular and Mediastinum    Hypertension     BP Readings from Last 3 Encounters:   21 128/80   21 126/70   20 120/80     Controlled, continue lisinopril 20 mg          Relevant Medications    lisinopril (ZESTRIL) 20 mg tablet       Other    Seasonal allergies - Primary     Will switch to Claritin from Zyrtec, trial Flonase and olopatadine drops  Discussed that medications help symptoms but generally do not fully resolve symptoms         Relevant Medications    loratadine (CLARITIN) 10 mg tablet    fluticasone (FLONASE) 50 mcg/act nasal spray      Other Visit Diagnoses     Allergic conjunctivitis of both eyes        Relevant Medications    olopatadine (PATANOL) 0 1 % ophthalmic solution    Colon cancer screening        Relevant Orders    Ambulatory referral for colonoscopy          SUBJECTIVE:  Antwon Lee is a 49 y o  female who presents today with a chief complaint of Follow-up (allergies)  HPI     She reports that she gets symptoms of allergies in the late spring and summer  She has itching eyes, rhinorrhea, sore throat, post nasal drip  She has been taking two Zyrtec and also tried visine allergies  Review of Systems   HENT: Positive for congestion, postnasal drip, rhinorrhea and sneezing  Negative for sinus pressure and sinus pain  Eyes: Positive for itching  Negative for pain and redness  I have reviewed the patient's Past Medical History      Current Outpatient Medications:     acetaminophen (TYLENOL) 500 mg tablet, Take 500 mg by mouth every 6 (six) hours as needed for mild pain, Disp: , Rfl:     Blood Pressure Monitoring (Blood Pressure Cuff) MISC, Use daily, Disp: 1 each, Rfl: 0    lisinopril (ZESTRIL) 20 mg tablet, Take 1 tablet (20 mg total) by mouth daily, Disp: 90 tablet, Rfl: 1    fluticasone (FLONASE) 50 mcg/act nasal spray, 2 sprays into each nostril daily, Disp: 16 g, Rfl: 1    loratadine (CLARITIN) 10 mg tablet, Take 1 tablet (10 mg total) by mouth daily, Disp: 90 tablet, Rfl: 1    olopatadine (PATANOL) 0 1 % ophthalmic solution, Administer 1 drop to both eyes 2 (two) times a day, Disp: 5 mL, Rfl: 0    OBJECTIVE:  /80   Pulse 60   Temp 98 °F (36 7 °C)   Resp 14   Ht 5' 4" (1 626 m)   Wt 92 1 kg (203 lb)   SpO2 98%   BMI 34 84 kg/m²    BP Readings from Last 3 Encounters:   05/19/21 128/80   02/23/21 126/70   11/17/20 120/80      Wt Readings from Last 3 Encounters:   05/19/21 92 1 kg (203 lb)   02/23/21 93 kg (205 lb)   01/19/21 89 8 kg (198 lb)      Physical Exam  Constitutional:       General: She is not in acute distress  Appearance: Normal appearance  She is normal weight  She is not ill-appearing or toxic-appearing  HENT:      Head: Normocephalic and atraumatic  Right Ear: External ear normal  A middle ear effusion is present  Left Ear: External ear normal       Nose: Mucosal edema present  Right Turbinates: Swollen and pale  Left Turbinates: Swollen  Mouth/Throat:      Lips: Pink  Mouth: Mucous membranes are moist    Eyes:      General: No allergic shiner  Right eye: No discharge  Left eye: No discharge  Extraocular Movements: Extraocular movements intact  Conjunctiva/sclera:      Right eye: Right conjunctiva is injected  Neck:      Musculoskeletal: Normal range of motion and neck supple  Cardiovascular:      Rate and Rhythm: Normal rate and regular rhythm  Pulmonary:      Effort: Pulmonary effort is normal  No respiratory distress  Musculoskeletal: Normal range of motion  Skin:     Findings: No erythema or rash  Neurological:      General: No focal deficit present  Mental Status: She is alert and oriented to person, place, and time  Psychiatric:         Mood and Affect: Mood normal          Behavior: Behavior normal                 Return in about 6 months (around 11/19/2021) for follow up and physical      Vicente Peres MD    Note: Portions of the record have been created with voice recognition software  Occasional wrong word or "sound a like" substitutions may have occurred due to the inherent limitations of voice recognition software  Read the chart carefully and recognize, using context, where substitutions have occurred

## 2021-05-19 NOTE — PATIENT INSTRUCTIONS
Detección de cáncer colorrectal   CUIDADO AMBULATORIO:   La detección de cáncer colorrectal sirve para encontrar problemas a tiempo y comenzar el tratamiento  El cáncer colorrectal es la tercera causa principal de muerte en los [place] y [place]  El tratamiento temprano puede salvarle la timi  Se recomienda que se natanael la [employer] 48 y 76 años de edad  Hardin médico le dirá si debe hacerse la prueba de detección antes de los 48 años o después de los 76 años de [place]  Tipos de prueba de detección disponibles: Hardin médico hablará con usted sobre los beneficios y riesgos de cada tipo de prueba de detección: Trabajará con usted para decidir qué prueba de detección es mejor para usted  También le dirá cuándo debe empezar a realizarse la prueba de detección  · El examen de leonard oculta en heces (FOBT) puede recolectarse en casa  El examen de leonard oculta en heces busca la presencia de leonard en gatito evacuaciones intestinales  Necesitará kika pequeña muestra de 2 a 3 evacuaciones intestinales  Hardin médico le dará tarjetas especiales para colocar la muestra  Usted devolverá las tarjetas al consultorio de hardin médico o a un laboratorio para que le terra la prueba  Si hardin prueba es positiva para la presencia de [place], usted necesitará hacerse kika colonoscopia  Kika colonoscopia o sigmoidoscopia flexible puede ayudar a encontrar de dónde proviene [place] [employer]  La leonard puede significar que usted tiene pólipos, cáncer u otras afecciones, manish hemorroides  Es posible que tenga que evitar ciertos medicamentos y alimentos jackie aproximadamente 3 días antes de ed la [place]  Hardin médico le dirá qué medicamentos y alimentos debe evitar  Si no se encuentra leonard, es posible que tenga que hacerse la prueba el próximo Bill  · Kika prueba inmunoquímica fecal (FIT) también se recoge en casa y luego se envía a un laboratorio para hardin análisis   La prueba inmunoquímica fecal también examina la presencia de leonard en las evacuaciones intestinales  Usted no tiene que evitar ningún medicamento o alimento para hacerse la prueba inmunoquímica fecal  Usted recoge Rosa Hawking de kika evacuación intestinal y la coloca en un recipiente especial  El recipiente se envía por correo o se lleva al consultorio o laboratorio de hardin médico  Si se encuentra leonard, es posible que necesite kika colonoscopia o kika sigmoidoscopia flexible  Si no se encuentra leonard, es posible que tenga que hacerse la prueba el próximo [place]  · Kika sigmoidoscopia es un procedimiento para bg dentro del recto y el colon sigmoide  El colon sigmoide es la parte inferior de los intestinos, la más cercana al recto  Se le insertará un sigmoidoscopio en el recto  Martita es un tubo con Con Gunnels ale y kika Gaby Bond en el extremo  Imágenes del colon aparecen en un monitor jackie el procedimiento  Kika sigmoidoscopia flexible podría ayudar a diagnosticar cáncer de colon, inflamación, pólipos (crecimientos) o infecciones  · Kika colonoscopia es un procedimiento para examinar con un endoscopio el interior de hardin colon (intestino)  La sonda es un tubo flexible con kika ale pequeña y Mozelle Bimler en la punta  Jackie kika colonoscopia, es posible que le retiren pólipos o crecimientos de tejidos para analizarlos y detectar la presencia de cáncer  · [place] colonoscopia virtual es un tipo de examen que utiliza radiografías para examinar el interior del colon (intestino grueso)  Los médicos utilizan kika tomografía computarizada o imágenes por resonancia magnética para ed imágenes del colon desde fuera de hardin cuerpo  Necesitará ingerir líquido de contraste para que las imágenes se aprecien mejor  Martita procedimiento se [place] para determinar si tiene pólipos (crecimientos) o cáncer  También puede controlar el tamaño de los pólipos  Es posible que necesite martita procedimiento para comprobar si el cáncer colorrectal ha vuelto a aparecer después del tratamiento   Se puede utilizar kika colonoscopia virtual si no es capaz de [employer] colonoscopia regular  · El ADN en hardin evacuación intestinal puede analizarse para detectar cambios genéticos  Los cambios genéticos pueden ser un signo de cáncer colorrectal     Con qué frecuencia puede necesitar pruebas de detección de cáncer colorrectal: Hardin médico le enseñará con qué frecuencia debe hacerse la prueba de detección  El momento depende del tipo de detección y si se encontraron pólipos u otros problemas  El momento también depende de hardin edad y de si usted tiene mayor riesgo de tener cáncer  Es posible que necesite hacerse la prueba cada 1, 2, 5 o 10 años  Riesgos de la detección del cáncer colorrectal: Siempre hay riesgos con cualquier tipo de detección  Hable con hardin médico sobre los riesgos de la detección  Es posible que [employer] indique lo siguiente:  · Puede ocurrir un resultado falso-negativo  El resultado puede retrasar el tratamiento porque muestra que no se encontró cáncer  Puede hacer que usted no reciba tratamiento incluso cuando tenga síntomas  · Puede ocurrir un resultado falso positivo  Tresa resultado puede hacer que le terra más pruebas  También puede hacer que se sienta ansioso si candida que tiene cáncer  © [employer] Information is for End User's use only and may not be sold, redistributed or otherwise used for commercial purposes  All illustrations and images included in CareNotes® are the copyrighted property of [employer]  or [address] [place] [address] es sólo para uso en educación  Hardin intención no es darle un consejo médico sobre enfermedades o tratamientos  Colsulte con hardin Archana Seals farmacéutico antes de seguir cualquier régimen médico para saber si es seguro y efectivo para usted

## 2021-05-21 ENCOUNTER — TELEPHONE (OUTPATIENT)
Dept: GASTROENTEROLOGY | Facility: CLINIC | Age: 51
End: 2021-05-21

## 2021-05-21 NOTE — TELEPHONE ENCOUNTER
Received order from Dr Yanni Castorena for pt to be scheduled for colon screening  However, pt is new to us and would need ov consult  Called  service,  [phone], whom lmom for pt to please call back to schedule an appt per Dr Yanni Castorena  Will call pt again in one week if do not hear back from her

## 2021-05-28 NOTE — TELEPHONE ENCOUNTER
Called  service spoke to  [phone] whom lmom for pt to please call us back to schedule an appt per Dr Dari Valenzuela  Will call pt again in two weeks if do not hear back from pt

## 2021-06-01 ENCOUNTER — IMMUNIZATIONS (OUTPATIENT)
Dept: FAMILY MEDICINE CLINIC | Facility: HOSPITAL | Age: 51
End: 2021-06-01

## 2021-06-01 DIAGNOSIS — Z23 ENCOUNTER FOR IMMUNIZATION: Primary | ICD-10-CM

## 2021-06-01 PROCEDURE — 0001A SARS-COV-2 / COVID-19 MRNA VACCINE (PFIZER-BIONTECH) 30 MCG: CPT

## 2021-06-01 PROCEDURE — 91300 SARS-COV-2 / COVID-19 MRNA VACCINE (PFIZER-BIONTECH) 30 MCG: CPT

## 2021-06-14 NOTE — TELEPHONE ENCOUNTER
Called  Service spoke to  [phone] whom informed that pt did not answer, no machine  Will wait for pt's call back at this time

## 2021-08-05 ENCOUNTER — OFFICE VISIT (OUTPATIENT)
Dept: DENTISTRY | Facility: CLINIC | Age: 51
End: 2021-08-05

## 2021-08-05 VITALS — SYSTOLIC BLOOD PRESSURE: 130 MMHG | TEMPERATURE: 97.7 F | HEART RATE: 77 BPM | DIASTOLIC BLOOD PRESSURE: 79 MMHG

## 2021-08-05 DIAGNOSIS — K02.9 CARIES: ICD-10-CM

## 2021-08-05 DIAGNOSIS — Z01.20 ENCOUNTER FOR DENTAL EXAMINATION AND CLEANING WITHOUT ABNORMAL FINDINGS: Primary | ICD-10-CM

## 2021-08-05 PROCEDURE — D4910 PERIODONTAL MAINTENANCE: HCPCS | Performed by: DENTIST

## 2021-08-05 PROCEDURE — D0210 INTRAORAL - COMPLETE SERIES OF RADIOGRAPHIC IMAGES: HCPCS | Performed by: DENTIST

## 2021-08-05 NOTE — PROGRESS NOTES
Comprehensive Exam    Pretty Shepherd presents for periodontal maintenance  Verbal consent for treatment given in addition to the forms  Reviewed health history - Patient is ASA II  Perio: Findings as charted  Areas of BOP, some pocket depths >3mm  Patient received SRP treatment in 2019 and did not return for regular recalls  OHI was reviewed with the patient who reports brushing 2x/day and flossing 1x/day, however, the patient had calculus and generalized moderate to severe plaque showing very poor oral hygiene  Gave patient a mirror and toothbrush to demonstrate bass technique of brushing  Pain Scale: 0/10  Caries Assessment: high  Radiographs: FMX taken today  Moderate generalized horizontal bone loss  No periapical pathology noted  Several areas of decay noted as charted  Soft tissue: WNL  TMJ: Left click on opening - no pain reported  Oral cancer screening completed with no findings    3-4 month recall visits recommended to the patient  Completed prophy today with hand instruments and cavitron  Please re-evaluate in 3-4 months and determine if SRP is needed again  Treatment Plan:  1  Periodontal maintenance every 3-4 months (re-evaluate pocket depths and possible need for SRP re-treatment)  2  Resin composites: #2-O, #5-O (possible MO), #11-D, #15-O, #17-O, #21-DL, #29-O, #32-O  3  Watch: #13- MOD (staining present)  4  Connective tissue + bone graft in site of previously extracted #9  5  Bridge spanning #8-10 (currently temporized)  6  Discuss upper and lower RPD fabrication if space allows and if teeth are periodontally stable  Patient was counseled on importance of returning for treatment  Patient had a flipper fabricated in 2019  Patient not interested in obtaining that flipper as she knows that it will likely not fit due to 2 year span between fabrication and delivery  Prognosis is guarded     Referrals needed: No      NV: begin resin composites (as outlined above)

## 2021-08-11 DIAGNOSIS — I10 ESSENTIAL HYPERTENSION: ICD-10-CM

## 2021-08-11 RX ORDER — LISINOPRIL 20 MG/1
20 TABLET ORAL DAILY
Qty: 90 TABLET | Refills: 1 | Status: SHIPPED | OUTPATIENT
Start: 2021-08-11 | End: 2022-02-23 | Stop reason: SDUPTHER

## 2021-10-13 ENCOUNTER — OFFICE VISIT (OUTPATIENT)
Dept: DENTISTRY | Facility: CLINIC | Age: 51
End: 2021-10-13

## 2021-10-13 VITALS — TEMPERATURE: 97.1 F | SYSTOLIC BLOOD PRESSURE: 120 MMHG | HEART RATE: 66 BPM | DIASTOLIC BLOOD PRESSURE: 80 MMHG

## 2021-10-13 DIAGNOSIS — K02.9 CARIES: Primary | ICD-10-CM

## 2021-10-13 PROCEDURE — D2391 RESIN-BASED COMPOSITE - 1 SURFACE, POSTERIOR: HCPCS | Performed by: DENTIST

## 2021-11-28 PROBLEM — U07.1 ACUTE RESPIRATORY DISEASE DUE TO COVID-19 VIRUS: Status: RESOLVED | Noted: 2021-01-19 | Resolved: 2021-11-28

## 2021-11-28 PROBLEM — J06.9 ACUTE RESPIRATORY DISEASE DUE TO COVID-19 VIRUS: Status: RESOLVED | Noted: 2021-01-19 | Resolved: 2021-11-28

## 2021-12-28 ENCOUNTER — OFFICE VISIT (OUTPATIENT)
Dept: FAMILY MEDICINE CLINIC | Facility: CLINIC | Age: 51
End: 2021-12-28
Payer: COMMERCIAL

## 2021-12-28 VITALS
HEART RATE: 64 BPM | DIASTOLIC BLOOD PRESSURE: 82 MMHG | SYSTOLIC BLOOD PRESSURE: 124 MMHG | WEIGHT: 203.5 LBS | HEIGHT: 64 IN | BODY MASS INDEX: 34.74 KG/M2 | OXYGEN SATURATION: 97 % | TEMPERATURE: 95.4 F

## 2021-12-28 DIAGNOSIS — E66.9 OBESITY (BMI 30.0-34.9): ICD-10-CM

## 2021-12-28 DIAGNOSIS — I10 PRIMARY HYPERTENSION: ICD-10-CM

## 2021-12-28 DIAGNOSIS — Z00.00 ANNUAL PHYSICAL EXAM: Primary | ICD-10-CM

## 2021-12-28 DIAGNOSIS — Z12.31 ENCOUNTER FOR SCREENING MAMMOGRAM FOR BREAST CANCER: ICD-10-CM

## 2021-12-28 DIAGNOSIS — Z23 ENCOUNTER FOR IMMUNIZATION: ICD-10-CM

## 2021-12-28 DIAGNOSIS — K21.9 GASTROESOPHAGEAL REFLUX DISEASE WITHOUT ESOPHAGITIS: ICD-10-CM

## 2021-12-28 PROBLEM — H00.015 HORDEOLUM EXTERNUM OF LEFT LOWER EYELID: Status: RESOLVED | Noted: 2020-10-27 | Resolved: 2021-12-28

## 2021-12-28 PROCEDURE — 90471 IMMUNIZATION ADMIN: CPT

## 2021-12-28 PROCEDURE — 90682 RIV4 VACC RECOMBINANT DNA IM: CPT

## 2021-12-28 PROCEDURE — 3074F SYST BP LT 130 MM HG: CPT | Performed by: FAMILY MEDICINE

## 2021-12-28 PROCEDURE — 99396 PREV VISIT EST AGE 40-64: CPT | Performed by: FAMILY MEDICINE

## 2021-12-28 PROCEDURE — 3079F DIAST BP 80-89 MM HG: CPT | Performed by: FAMILY MEDICINE

## 2021-12-28 PROCEDURE — 3008F BODY MASS INDEX DOCD: CPT | Performed by: FAMILY MEDICINE

## 2021-12-28 PROCEDURE — 1036F TOBACCO NON-USER: CPT | Performed by: FAMILY MEDICINE

## 2021-12-28 PROCEDURE — 3725F SCREEN DEPRESSION PERFORMED: CPT | Performed by: FAMILY MEDICINE

## 2022-02-23 DIAGNOSIS — I10 ESSENTIAL HYPERTENSION: ICD-10-CM

## 2022-02-23 RX ORDER — LISINOPRIL 20 MG/1
20 TABLET ORAL DAILY
Qty: 90 TABLET | Refills: 1 | Status: SHIPPED | OUTPATIENT
Start: 2022-02-23 | End: 2022-08-03 | Stop reason: SDUPTHER

## 2022-05-17 ENCOUNTER — OFFICE VISIT (OUTPATIENT)
Dept: DENTISTRY | Facility: CLINIC | Age: 52
End: 2022-05-17

## 2022-05-17 VITALS — SYSTOLIC BLOOD PRESSURE: 125 MMHG | TEMPERATURE: 98 F | DIASTOLIC BLOOD PRESSURE: 79 MMHG | HEART RATE: 53 BPM

## 2022-05-17 DIAGNOSIS — K05.6 PERIODONTAL DISEASE: ICD-10-CM

## 2022-05-17 DIAGNOSIS — Z59.9 FINANCIAL DIFFICULTIES: Primary | ICD-10-CM

## 2022-05-17 PROCEDURE — D0120 PERIODIC ORAL EVALUATION - ESTABLISHED PATIENT: HCPCS | Performed by: DENTIST

## 2022-05-17 PROCEDURE — D4910 PERIODONTAL MAINTENANCE: HCPCS

## 2022-05-17 SDOH — ECONOMIC STABILITY - INCOME SECURITY: PROBLEM RELATED TO HOUSING AND ECONOMIC CIRCUMSTANCES, UNSPECIFIED: Z59.9

## 2022-05-17 NOTE — PROGRESS NOTES
Periodic Exam    Nori Mendez presents for [facility], periodic exam, perio charting  Reviewed meds/hhx in Epic  ASA II Ref to care management given  CC: 0    Completed oral cancer screening, no abnormal findings  No xrays  Completed restorative exam and perio Full probing  New restorative needs updated  Loc molar PD >4,  BOP, gingiva is red, not stippled, inflammed w/ loc subcalc  Pt tx planned for 1-3th SRP in each Quad  Discussed perio disease, pt understands and agrees  Dr Amanda Cassidy Ex:  Updated trtmnt plan  Pt intersted in Max and jorden partial dentures but need to get perio stable amber on 2,15,17 and 32 to support partial dentures       1)UR/LR 1-3th 60 mins  2)UR/LR 1-3teeth 60 mins  Perio RE-eval

## 2022-05-18 ENCOUNTER — PATIENT OUTREACH (OUTPATIENT)
Dept: PEDIATRICS CLINIC | Facility: CLINIC | Age: 52
End: 2022-05-18

## 2022-05-18 NOTE — PROGRESS NOTES
Consult received from [address] [place] [address], requesting MSW to assist patient with financial difficulties expressed via the Social Determinant's screening at Dental appt, yesterday  Patient is Beninese speaking only  MSW CM contacted patient via phone call, introduced self, role and reason for calling in Beninese  Patient reported, she works full time  Patient denies experiencing financial difficulties presently  Per patient " is probably a miscommunication, due to the language barrier"  Patient does not meet citizenship's criteria for government insurance nor benefits  Patient approved for the [facility] 62- Sliding Fee Scale Program for medical and basic dental from 10/12/21 to 10/12/22  Patient recommended to contact her PCP if needs arises   MSW will close referral

## 2022-05-31 DIAGNOSIS — J30.2 SEASONAL ALLERGIES: ICD-10-CM

## 2022-05-31 RX ORDER — FLUTICASONE PROPIONATE 50 MCG
2 SPRAY, SUSPENSION (ML) NASAL DAILY
Qty: 16 G | Refills: 1 | Status: SHIPPED | OUTPATIENT
Start: 2022-05-31

## 2022-05-31 RX ORDER — LORATADINE 10 MG/1
10 TABLET ORAL DAILY
Qty: 90 TABLET | Refills: 1 | Status: SHIPPED | OUTPATIENT
Start: 2022-05-31

## 2022-05-31 NOTE — TELEPHONE ENCOUNTER
Patient is requesting a refill on her Loratadine 10 mg #90 (on med list but unable to click the request for refill)

## 2022-06-27 PROBLEM — H81.399 PERIPHERAL POSITIONAL VERTIGO: Status: RESOLVED | Noted: 2021-02-23 | Resolved: 2022-06-27

## 2022-06-27 PROBLEM — M54.2 NECK PAIN: Status: RESOLVED | Noted: 2020-06-17 | Resolved: 2022-06-27

## 2022-06-27 PROBLEM — G44.209 TENSION TYPE HEADACHE: Status: RESOLVED | Noted: 2019-07-09 | Resolved: 2022-06-27

## 2022-08-02 NOTE — PROGRESS NOTES
FAMILY MEDICINE PROGRESS NOTE    Date of Service: 22  Primary Care Provider:   Dorita Palacio MD       Name: Reggie Candelario       : 1970       Age:52 y o  Sex: female      MRN: [id number]      Chief Complaint:Follow-up       ASSESSMENT and PLAN:  Reggie Candelario is a 47 y o  female with:     Problem List Items Addressed This Visit        Digestive    GERD (gastroesophageal reflux disease)       Cardiovascular and Mediastinum    Primary hypertension - Primary     BP Readings from Last 3 Encounters:   22 118/82   22 125/79   21 124/82     Controlled, continue lisinopril 20 mg          Relevant Medications    lisinopril (ZESTRIL) 20 mg tablet       Musculoskeletal and Integument    Tinea pedis of both feet     Recommended OTC treatments, trial powder in socks and shoes             Other    Obesity (BMI 30 0-34  9)     She lost 20 lbs since December! Congratulated patient on her weight loss efforts          Left facial numbness     Intermittent, no pain, rashes, jaw pain, headaches  Sounds to be neuropathic pain, possibly related to previous dental procedures  Discussed trial of gabapentin, but symptoms are not very severe  Will continue to monitor  Other Visit Diagnoses     Colon cancer screening        Relevant Orders    Ambulatory referral for colonoscopy          SUBJECTIVE:  Reggie Candelario is a 47 y o  female who presents today with a chief complaint of Follow-up  HPI     Patient presents today for follow-up  She has lost 20 lbs since her last visit  She has been eating less, and not eating past 6 pm at night  She occasionally has sensation of sensitive skin, burning pain on left side of face  No dental or jaw pain, no rash when this occurs  It will last for about one day, sometimes two days then go away  It comes on randomly  She did have dental work done in this side recently  Review of Systems  I have reviewed the patient's Past Medical History      Current Outpatient Medications:     fluticasone (FLONASE) 50 mcg/act nasal spray, 2 sprays into each nostril daily, Disp: 16 g, Rfl: 1    lisinopril (ZESTRIL) 20 mg tablet, Take 1 tablet (20 mg total) by mouth daily, Disp: 90 tablet, Rfl: 3    loratadine (CLARITIN) 10 mg tablet, Take 1 tablet (10 mg total) by mouth daily, Disp: 90 tablet, Rfl: 1    acetaminophen (TYLENOL) 500 mg tablet, Take 500 mg by mouth every 6 (six) hours as needed for mild pain (Patient not taking: Reported on 8/3/2022), Disp: , Rfl:     olopatadine (PATANOL) 0 1 % ophthalmic solution, Administer 1 drop to both eyes 2 (two) times a day (Patient not taking: Reported on 8/3/2022), Disp: 5 mL, Rfl: 0    OBJECTIVE:  /82   Pulse 55   Temp (!) 96 6 °F (35 9 °C)   Resp 16   Ht 5' 4" (1 626 m)   Wt 83 5 kg (184 lb)   LMP 11/23/2020 (Approximate)   SpO2 98%   BMI 31 58 kg/m²    BP Readings from Last 3 Encounters:   08/03/22 118/82   05/17/22 125/79   12/28/21 124/82      Wt Readings from Last 3 Encounters:   08/03/22 83 5 kg (184 lb)   12/28/21 92 3 kg (203 lb 8 oz)   05/19/21 92 1 kg (203 lb)      Physical Exam  Constitutional:       General: She is not in acute distress  Appearance: Normal appearance  She is normal weight  She is not ill-appearing or toxic-appearing  HENT:      Head: Normocephalic and atraumatic  Right Ear: External ear normal       Left Ear: External ear normal       Nose: Nose normal       Mouth/Throat:      Mouth: Mucous membranes are moist    Eyes:      Extraocular Movements: Extraocular movements intact  Conjunctiva/sclera: Conjunctivae normal    Pulmonary:      Effort: Pulmonary effort is normal  No respiratory distress  Musculoskeletal:         General: Normal range of motion  Cervical back: Normal range of motion and neck supple  Skin:     Findings: No erythema or rash  Neurological:      General: No focal deficit present        Mental Status: She is alert and oriented to person, place, and time  Psychiatric:         Mood and Affect: Mood normal          Behavior: Behavior normal                 Return in about 1 year (around 8/3/2023) for Annual physical     Xiomara Knowles MD    Note: Portions of the record have been created with voice recognition software  Occasional wrong word or "sound a like" substitutions may have occurred due to the inherent limitations of voice recognition software  Read the chart carefully and recognize, using context, where substitutions have occurred

## 2022-08-03 ENCOUNTER — OFFICE VISIT (OUTPATIENT)
Dept: FAMILY MEDICINE CLINIC | Facility: CLINIC | Age: 52
End: 2022-08-03
Payer: MEDICARE

## 2022-08-03 VITALS
SYSTOLIC BLOOD PRESSURE: 118 MMHG | HEIGHT: 64 IN | BODY MASS INDEX: 31.41 KG/M2 | HEART RATE: 55 BPM | OXYGEN SATURATION: 98 % | WEIGHT: 184 LBS | DIASTOLIC BLOOD PRESSURE: 82 MMHG | TEMPERATURE: 96.6 F | RESPIRATION RATE: 16 BRPM

## 2022-08-03 DIAGNOSIS — R20.0 LEFT FACIAL NUMBNESS: ICD-10-CM

## 2022-08-03 DIAGNOSIS — K21.9 GASTROESOPHAGEAL REFLUX DISEASE WITHOUT ESOPHAGITIS: ICD-10-CM

## 2022-08-03 DIAGNOSIS — I10 PRIMARY HYPERTENSION: Primary | ICD-10-CM

## 2022-08-03 DIAGNOSIS — E66.9 OBESITY (BMI 30.0-34.9): ICD-10-CM

## 2022-08-03 DIAGNOSIS — Z12.11 COLON CANCER SCREENING: ICD-10-CM

## 2022-08-03 DIAGNOSIS — B35.3 TINEA PEDIS OF BOTH FEET: ICD-10-CM

## 2022-08-03 PROCEDURE — 99214 OFFICE O/P EST MOD 30 MIN: CPT | Performed by: FAMILY MEDICINE

## 2022-08-03 RX ORDER — LISINOPRIL 20 MG/1
20 TABLET ORAL DAILY
Qty: 90 TABLET | Refills: 3 | Status: SHIPPED | OUTPATIENT
Start: 2022-08-03

## 2022-08-03 NOTE — ASSESSMENT & PLAN NOTE
BP Readings from Last 3 Encounters:   08/03/22 118/82   05/17/22 125/79   12/28/21 124/82     Controlled, continue lisinopril 20 mg

## 2022-10-12 ENCOUNTER — TELEPHONE (OUTPATIENT)
Dept: PSYCHIATRY | Facility: CLINIC | Age: 52
End: 2022-10-12

## 2022-10-19 ENCOUNTER — DOCUMENTATION (OUTPATIENT)
Dept: DENTISTRY | Facility: CLINIC | Age: 52
End: 2022-10-19

## 2022-10-19 NOTE — PROGRESS NOTES
Pt was scheduled today 10/19/2022 at 9:30 am for Scaling/ root planing on UR and LR and NO SHOWED to appt      Seth Srivastava

## 2023-03-14 ENCOUNTER — OFFICE VISIT (OUTPATIENT)
Dept: FAMILY MEDICINE CLINIC | Facility: CLINIC | Age: 53
End: 2023-03-14

## 2023-03-14 VITALS
BODY MASS INDEX: 31.58 KG/M2 | HEART RATE: 48 BPM | OXYGEN SATURATION: 97 % | TEMPERATURE: 97.4 F | HEIGHT: 64 IN | SYSTOLIC BLOOD PRESSURE: 130 MMHG | DIASTOLIC BLOOD PRESSURE: 80 MMHG

## 2023-03-14 DIAGNOSIS — A08.4 VIRAL DIARRHEA: Primary | ICD-10-CM

## 2023-03-14 NOTE — PROGRESS NOTES
Name: Abhishek Mcclelland      : 1970      MRN: [id number]  Encounter Provider: ISABELLA Lux  Encounter Date: 3/14/2023   Encounter department: [address] [place] [address]  Viral diarrhea  Discussed with patient plan to treat with conservative measures: use of the BRAT diet and over the counter Imodium as needed  Patient instructed to call if no improvement in 72 hours or symptoms worsen       BMI Counseling: Body mass index is 31 58 kg/m²  The BMI is above normal  Nutrition recommendations include decreasing portion sizes, encouraging healthy choices of fruits and vegetables, consuming healthier snacks, moderation in carbohydrate intake and increasing intake of lean protein  Exercise recommendations include exercising 3-5 times per week and strength training exercises  Rationale for BMI follow-up plan is due to patient being overweight or obese  Depression Screening and Follow-up Plan: Patient was screened for depression during today's encounter  They screened negative with a PHQ-2 score of 0  Subjective      52 y  o female presenting with diarrhea and abdominal pain for the past three days  She reports having loose stools once a day with mild lower abdominal pains  She denies fever, chills, generalized body aches, nausea or vomiting  Her daughter has had similar symptoms for the last week  She has not taken or done anything to treat symptoms  Review of Systems   Constitutional: Negative for chills, fatigue and fever  Respiratory: Negative  Cardiovascular: Negative  Gastrointestinal: Positive for abdominal pain and diarrhea  Negative for abdominal distention  Musculoskeletal: Negative  Neurological: Negative  Psychiatric/Behavioral: Negative          Current Outpatient Medications on File Prior to Visit   Medication Sig   • lisinopril (ZESTRIL) 20 mg tablet Take 1 tablet (20 mg total) by mouth daily   • [DISCONTINUED] acetaminophen (TYLENOL) 500 mg tablet Take 500 mg by mouth every 6 (six) hours as needed for mild pain (Patient not taking: Reported on 8/3/2022)   • [DISCONTINUED] fluticasone (FLONASE) 50 mcg/act nasal spray 2 sprays into each nostril daily (Patient not taking: Reported on 3/14/2023)   • [DISCONTINUED] loratadine (CLARITIN) 10 mg tablet Take 1 tablet (10 mg total) by mouth daily (Patient not taking: Reported on 3/14/2023)   • [DISCONTINUED] olopatadine (PATANOL) 0 1 % ophthalmic solution Administer 1 drop to both eyes 2 (two) times a day       Objective     /80 (BP Location: Left arm, Patient Position: Sitting, Cuff Size: Large)   Pulse (!) 48   Temp (!) 97 4 °F (36 3 °C)   Ht 5' 4" (1 626 m)   LMP 11/23/2020 (Approximate)   SpO2 97%   BMI 31 58 kg/m² (Reviewed)    Physical Exam  Constitutional:       General: She is not in acute distress  Appearance: She is not ill-appearing  HENT:      Head: Normocephalic and atraumatic  Right Ear: External ear normal       Left Ear: External ear normal    Eyes:      Extraocular Movements: Extraocular movements intact  Conjunctiva/sclera: Conjunctivae normal       Pupils: Pupils are equal, round, and reactive to light  Abdominal:      General: There is no distension  Tenderness: There is abdominal tenderness in the epigastric area  There is no guarding or rebound  Musculoskeletal:      Cervical back: Neck supple  Skin:     General: Skin is warm  Neurological:      General: No focal deficit present  Mental Status: She is alert and oriented to person, place, and time     Psychiatric:         Mood and Affect: Mood normal          Behavior: Behavior normal        ISABELLA Oneil

## 2023-05-17 DIAGNOSIS — J30.2 SEASONAL ALLERGIES: Primary | ICD-10-CM

## 2023-05-17 RX ORDER — LORATADINE 10 MG/1
10 TABLET ORAL DAILY
Qty: 90 TABLET | Refills: 3 | Status: SHIPPED | OUTPATIENT
Start: 2023-05-17

## 2023-06-03 ENCOUNTER — HOSPITAL ENCOUNTER (EMERGENCY)
Facility: HOSPITAL | Age: 53
Discharge: HOME | End: 2023-06-03
Attending: EMERGENCY MEDICINE | Admitting: EMERGENCY MEDICINE
Payer: MEDICARE

## 2023-06-03 ENCOUNTER — APPOINTMENT (EMERGENCY)
Dept: CT IMAGING | Facility: HOSPITAL | Age: 53
End: 2023-06-03
Payer: MEDICARE

## 2023-06-03 ENCOUNTER — APPOINTMENT (EMERGENCY)
Dept: RADIOLOGY | Facility: HOSPITAL | Age: 53
End: 2023-06-03
Payer: MEDICARE

## 2023-06-03 VITALS
HEART RATE: 56 BPM | RESPIRATION RATE: 18 BRPM | BODY MASS INDEX: 32.09 KG/M2 | OXYGEN SATURATION: 100 % | WEIGHT: 186.95 LBS | TEMPERATURE: 98.4 F | SYSTOLIC BLOOD PRESSURE: 130 MMHG | DIASTOLIC BLOOD PRESSURE: 80 MMHG

## 2023-06-03 DIAGNOSIS — E87.6 HYPOKALEMIA: ICD-10-CM

## 2023-06-03 DIAGNOSIS — R55 SYNCOPE: Primary | ICD-10-CM

## 2023-06-03 DIAGNOSIS — R07.9 CHEST PAIN: ICD-10-CM

## 2023-06-03 LAB
2HR DELTA HS TROPONIN: <-2 NG/L
ALBUMIN SERPL BCP-MCNC: 3.7 G/DL (ref 3.5–5)
ALP SERPL-CCNC: 55 U/L (ref 34–104)
ALT SERPL W P-5'-P-CCNC: 10 U/L (ref 7–52)
ANION GAP SERPL CALCULATED.3IONS-SCNC: 7 MMOL/L (ref 4–13)
AST SERPL W P-5'-P-CCNC: 15 U/L (ref 13–39)
BASOPHILS # BLD AUTO: 0.04 THOUSANDS/ÂΜL (ref 0–0.1)
BASOPHILS NFR BLD AUTO: 0 % (ref 0–1)
BILIRUB SERPL-MCNC: 0.42 MG/DL (ref 0.2–1)
BUN SERPL-MCNC: 21 MG/DL (ref 5–25)
CALCIUM SERPL-MCNC: 8.6 MG/DL (ref 8.4–10.2)
CARDIAC TROPONIN I PNL SERPL HS: 4 NG/L
CARDIAC TROPONIN I PNL SERPL HS: <2 NG/L
CHLORIDE SERPL-SCNC: 107 MMOL/L (ref 96–108)
CO2 SERPL-SCNC: 24 MMOL/L (ref 21–32)
CREAT SERPL-MCNC: 0.62 MG/DL (ref 0.6–1.3)
D DIMER PPP FEU-MCNC: 0.57 UG/ML FEU
EOSINOPHIL # BLD AUTO: 0.26 THOUSAND/ÂΜL (ref 0–0.61)
EOSINOPHIL NFR BLD AUTO: 3 % (ref 0–6)
ERYTHROCYTE [DISTWIDTH] IN BLOOD BY AUTOMATED COUNT: 12.8 % (ref 11.6–15.1)
GFR SERPL CREATININE-BSD FRML MDRD: 104 ML/MIN/1.73SQ M
GLUCOSE SERPL-MCNC: 96 MG/DL (ref 65–140)
HCT VFR BLD AUTO: 33.9 % (ref 34.8–46.1)
HGB BLD-MCNC: 11.1 G/DL (ref 11.5–15.4)
IMM GRANULOCYTES # BLD AUTO: 0.02 THOUSAND/UL (ref 0–0.2)
IMM GRANULOCYTES NFR BLD AUTO: 0 % (ref 0–2)
LYMPHOCYTES # BLD AUTO: 2.45 THOUSANDS/ÂΜL (ref 0.6–4.47)
LYMPHOCYTES NFR BLD AUTO: 27 % (ref 14–44)
MCH RBC QN AUTO: 29.2 PG (ref 26.8–34.3)
MCHC RBC AUTO-ENTMCNC: 32.7 G/DL (ref 31.4–37.4)
MCV RBC AUTO: 89 FL (ref 82–98)
MONOCYTES # BLD AUTO: 0.46 THOUSAND/ÂΜL (ref 0.17–1.22)
MONOCYTES NFR BLD AUTO: 5 % (ref 4–12)
NEUTROPHILS # BLD AUTO: 5.91 THOUSANDS/ÂΜL (ref 1.85–7.62)
NEUTS SEG NFR BLD AUTO: 65 % (ref 43–75)
NRBC BLD AUTO-RTO: 0 /100 WBCS
PLATELET # BLD AUTO: 218 THOUSANDS/UL (ref 149–390)
PMV BLD AUTO: 10.4 FL (ref 8.9–12.7)
POTASSIUM SERPL-SCNC: 3.2 MMOL/L (ref 3.5–5.3)
PROT SERPL-MCNC: 6.7 G/DL (ref 6.4–8.4)
RBC # BLD AUTO: 3.8 MILLION/UL (ref 3.81–5.12)
SODIUM SERPL-SCNC: 138 MMOL/L (ref 135–147)
WBC # BLD AUTO: 9.14 THOUSAND/UL (ref 4.31–10.16)

## 2023-06-03 PROCEDURE — 85379 FIBRIN DEGRADATION QUANT: CPT

## 2023-06-03 PROCEDURE — 71275 CT ANGIOGRAPHY CHEST: CPT

## 2023-06-03 PROCEDURE — G1004 CDSM NDSC: HCPCS

## 2023-06-03 PROCEDURE — 93005 ELECTROCARDIOGRAM TRACING: CPT

## 2023-06-03 PROCEDURE — 71045 X-RAY EXAM CHEST 1 VIEW: CPT

## 2023-06-03 PROCEDURE — 85025 COMPLETE CBC W/AUTO DIFF WBC: CPT | Performed by: EMERGENCY MEDICINE

## 2023-06-03 PROCEDURE — 84484 ASSAY OF TROPONIN QUANT: CPT | Performed by: EMERGENCY MEDICINE

## 2023-06-03 PROCEDURE — 70450 CT HEAD/BRAIN W/O DYE: CPT

## 2023-06-03 PROCEDURE — 99284 EMERGENCY DEPT VISIT MOD MDM: CPT

## 2023-06-03 PROCEDURE — 36415 COLL VENOUS BLD VENIPUNCTURE: CPT

## 2023-06-03 PROCEDURE — 72125 CT NECK SPINE W/O DYE: CPT

## 2023-06-03 PROCEDURE — 80053 COMPREHEN METABOLIC PANEL: CPT | Performed by: EMERGENCY MEDICINE

## 2023-06-03 RX ORDER — ONDANSETRON 2 MG/ML
1 INJECTION INTRAMUSCULAR; INTRAVENOUS ONCE
Status: COMPLETED | OUTPATIENT
Start: 2023-06-03 | End: 2023-06-03

## 2023-06-03 RX ORDER — POTASSIUM CHLORIDE 20 MEQ/1
40 TABLET, EXTENDED RELEASE ORAL ONCE
Status: COMPLETED | OUTPATIENT
Start: 2023-06-03 | End: 2023-06-03

## 2023-06-03 RX ADMIN — IOHEXOL 60 ML: 350 INJECTION, SOLUTION INTRAVENOUS at 21:34

## 2023-06-03 RX ADMIN — POTASSIUM CHLORIDE 40 MEQ: 1500 TABLET, EXTENDED RELEASE ORAL at 21:10

## 2023-06-03 NOTE — ED PROVIDER NOTES
History  Chief Complaint   Patient presents with   • Syncope     Pt arrives via EMS from home after syncopal episode  Pt found down by family member in kitchen  Unknown head strike  Pt c/o dizziness and neck pain  Denies aspirin and thinners  Pt c/o chest pressure earlier in the day  Pt given aspirin and nitro x1 by EMS        History provided by:  Patient (family in the room (patient's sisters, nieces))   used: Yes    69-year-old female with history of hypertension, GERD, history of provoked DVT after knee surgery several years ago presents to the emergency department after 1 episode of syncope  Per patient's family, patient was cooking in the kitchen and felt hot and lightheaded, went to the bathroom, felt nauseous, walked back out to the living room and felt a little lightheaded with some mild chest pressure so she sat down, at which time she was observed to pass out and fall forward, they think she hit her forehead when she fell  They estimates she was unconscious for approximately 5 minutes  They deny any seizure-like activity, she maintained breathing on her own  The patient denies any palpitations  This happened to her once before 12 years ago  The patient tells me that she ate normally today, maybe drank a little less than normal   No vomiting or diarrhea  Here, she does not have any chest pain or pressure, no shortness of breath or difficulty breathing, no palpitations, no abd pain  Her heart rate is in the mid 50s consistently here, on review of several previous office visits, this appears to be baseline for her, with 1 visit having a heart rate as low as 48 bpm   She is not on any beta-blockers  She does not have a headache or any pain in her face  She does have some left-sided lateral neck pain from the c-collar she is wearing, she denies any cervical spine tenderness  She tells me she is having normal, regular bowel movements, denies any blood or melena      Prior to "Admission Medications   Prescriptions Last Dose Informant Patient Reported? Taking?   lisinopril (ZESTRIL) 20 mg tablet   No No   Sig: Take 1 tablet (20 mg total) by mouth daily   loratadine (CLARITIN) 10 mg tablet   No No   Sig: Take 1 tablet (10 mg total) by mouth daily      Facility-Administered Medications: None       Past Medical History:   Diagnosis Date   • Acute respiratory disease due to COVID-19 virus 1/19/2021   • Hypertension        Past Surgical History:   Procedure Laterality Date   • KNEE SURGERY     • OTHER SURGICAL HISTORY      right surgery        Family History   Problem Relation Age of Onset   • No Known Problems Mother    • No Known Problems Father    • No Known Problems Sister    • No Known Problems Maternal Grandmother    • No Known Problems Maternal Grandfather    • No Known Problems Paternal Grandmother    • No Known Problems Paternal Grandfather    • No Known Problems Sister    • No Known Problems Sister    • No Known Problems Maternal Aunt    • No Known Problems Maternal Aunt      I have reviewed and agree with the history as documented  E-Cigarette/Vaping   • E-Cigarette Use Never User      E-Cigarette/Vaping Substances   • Nicotine No    • THC No    • CBD No    • Flavoring No    • Other No    • Unknown No      Social History     Tobacco Use   • Smoking status: Never   • Smokeless tobacco: Never   Vaping Use   • Vaping Use: Never used   Substance Use Topics   • Alcohol use: Never   • Drug use: Never        Review of Systems   Constitutional: Negative for chills and fever  HENT: Negative for ear pain and sore throat  Eyes: Negative for visual disturbance  Respiratory: Negative for cough and shortness of breath  Cardiovascular: Positive for chest pain (\"pressure\")  Negative for leg swelling  Gastrointestinal: Negative for abdominal pain, blood in stool, constipation, diarrhea, nausea and vomiting  Genitourinary: Negative for dysuria and hematuria     Musculoskeletal: " Negative for neck pain and neck stiffness  Neurological: Positive for syncope and light-headedness  Negative for dizziness and weakness  All other systems reviewed and are negative  Physical Exam  ED Triage Vitals   Temperature Pulse Respirations Blood Pressure SpO2   06/03/23 1846 06/03/23 1844 06/03/23 1844 06/03/23 1844 06/03/23 1844   98 4 °F (36 9 °C) 56 18 130/80 100 %      Temp Source Heart Rate Source Patient Position - Orthostatic VS BP Location FiO2 (%)   06/03/23 1846 06/03/23 1844 06/03/23 1844 06/03/23 1844 --   Oral Monitor Lying Right arm       Pain Score       --                    Orthostatic Vital Signs  Vitals:    06/03/23 1844   BP: 130/80   Pulse: 56   Patient Position - Orthostatic VS: Lying       Physical Exam  Vitals and nursing note reviewed  Constitutional:       General: She is not in acute distress  Appearance: She is well-developed  She is not ill-appearing, toxic-appearing or diaphoretic  Comments: Awake and alert went into the room, laughing smiling, talking without difficulty  Not in acute distress  HENT:      Head: Normocephalic and atraumatic  Eyes:      Extraocular Movements: Extraocular movements intact  Conjunctiva/sclera: Conjunctivae normal       Pupils: Pupils are equal, round, and reactive to light  Cardiovascular:      Rate and Rhythm: Regular rhythm  Bradycardia present  Heart sounds: Normal heart sounds  No murmur heard  Pulmonary:      Effort: Pulmonary effort is normal  No respiratory distress  Breath sounds: Normal breath sounds  No wheezing, rhonchi or rales  Abdominal:      Palpations: Abdomen is soft  Tenderness: There is no abdominal tenderness  There is no guarding or rebound  Musculoskeletal:         General: No swelling  Cervical back: Normal range of motion and neck supple  Tenderness (left lateral pain) present  No rigidity  Right lower leg: No edema  Left lower leg: No edema     Skin: General: Skin is warm and dry  Capillary Refill: Capillary refill takes less than 2 seconds  Findings: No rash  Neurological:      General: No focal deficit present  Mental Status: She is alert and oriented to person, place, and time  Cranial Nerves: No cranial nerve deficit  Sensory: No sensory deficit  Motor: No weakness  Coordination: Coordination normal    Psychiatric:         Mood and Affect: Mood normal          Behavior: Behavior normal          ED Medications  Medications   ondansetron (FOR EMS ONLY) (ZOFRAN) 4 mg/2 mL injection 4 mg (0 mg Does not apply Given to EMS 6/3/23 1848)   potassium chloride (K-DUR,KLOR-CON) CR tablet 40 mEq (40 mEq Oral Given 6/3/23 2110)   iohexol (OMNIPAQUE) 350 MG/ML injection (SINGLE-DOSE) 60 mL (60 mL Intravenous Given 6/3/23 2134)       Diagnostic Studies  Results Reviewed     Procedure Component Value Units Date/Time    HS Troponin I 2hr [[id number]]  (Normal) Collected: 06/03/23 2113    Lab Status: Final result Specimen: Blood from Arm, Left Updated: 06/03/23 2146     hs TnI 2hr <2 ng/L      Delta 2hr hsTnI <-2 ng/L     D-Dimer [[id number]]  (Abnormal) Collected: 06/03/23 1905    Lab Status: Final result Specimen: Blood from Arm, Left Updated: 06/03/23 2107     D-Dimer, Quant 0 57 ug/ml FEU     Narrative: In the evaluation for possible pulmonary embolism, in the appropriate (Well's Score of 4 or less) patient, the age adjusted d-dimer cutoff for this patient can be calculated as:    Age x 0 01 (in ug/mL) for Age-adjusted D-dimer exclusion threshold for a patient over 50 years      Comprehensive metabolic panel [[id number]]  (Abnormal) Collected: 06/03/23 1905    Lab Status: Final result Specimen: Blood from Arm, Left Updated: 06/03/23 2017     Sodium 138 mmol/L      Potassium 3 2 mmol/L      Chloride 107 mmol/L      CO2 24 mmol/L      ANION GAP 7 mmol/L      BUN 21 mg/dL      Creatinine 0 62 mg/dL      Glucose 96 mg/dL      Calcium 8 6 mg/dL      AST 15 U/L      ALT 10 U/L      Alkaline Phosphatase 55 U/L      Total Protein 6 7 g/dL      Albumin 3 7 g/dL      Total Bilirubin 0 42 mg/dL      eGFR 104 ml/min/1 73sq m     Narrative:      National Kid[employer] Disease Foundation guidelines for Chronic Kidney Disease (CKD):   •  Stage 1 with normal or high GFR (GFR > 90 mL/min/1 73 square meters)  •  Stage 2 Mild CKD (GFR = 60-89 mL/min/1 73 square meters)  •  Stage 3A Moderate CKD (GFR = 45-59 mL/min/1 73 square meters)  •  Stage 3B Moderate CKD (GFR = 30-44 mL/min/1 73 square meters)  •  Stage 4 Severe CKD (GFR = 15-29 mL/min/1 73 square meters)  •  Stage 5 End Stage CKD (GFR <15 mL/min/1 73 square meters)  Note: GFR calculation is accurate only with a steady state creatinine    HS Troponin 0hr (reflex protocol) [[id number]]  (Normal) Collected: 06/03/23 1905    Lab Status: Final result Specimen: Blood from Arm, Left Updated: 06/03/23 1946     hs TnI 0hr 4 ng/L     CBC and differential [[id number]]  (Abnormal) Collected: 06/03/23 1905    Lab Status: Final result Specimen: Blood from Arm, Left Updated: 06/03/23 1919     WBC 9 14 Thousand/uL      RBC 3 80 Million/uL      Hemoglobin 11 1 g/dL      Hematocrit 33 9 %      MCV 89 fL      MCH 29 2 pg      MCHC 32 7 g/dL      RDW 12 8 %      MPV 10 4 fL      Platelets 294 Thousands/uL      nRBC 0 /100 WBCs      Neutrophils Relative 65 %      Immat GRANS % 0 %      Lymphocytes Relative 27 %      Monocytes Relative 5 %      Eosinophils Relative 3 %      Basophils Relative 0 %      Neutrophils Absolute 5 91 Thousands/µL      Immature Grans Absolute 0 02 Thousand/uL      Lymphocytes Absolute 2 45 Thousands/µL      Monocytes Absolute 0 46 Thousand/µL      Eosinophils Absolute 0 26 Thousand/µL      Basophils Absolute 0 04 Thousands/µL                  CTA ED chest PE study   Final Result by Amber Woodard MD (06/03 2200)      No evidence of pulmonary embolus  Small hiatal hernia              Workstation performed: [id number]         CT head without contrast   Final Result by Kiran Schwarz MD (06/03 2113)      No acute intracranial abnormality  Workstation performed: [id number]         CT spine cervical without contrast   Final Result by Kiran Schwarz MD (06/03 2120)      No cervical spine fracture or traumatic malalignment  Workstation performed: [id number]         XR chest 1 view portable   Final Result by Kalee Caraballo MD (06/04 1122)      No acute cardiopulmonary disease  Workstation performed: [id number]               Procedures  ECG 12 Lead Documentation Only    Date/Time: 6/3/2023 8:00 PM    Performed by: Sri Oliveros MD  Authorized by: Sri Oliveros MD    Indications / Diagnosis:  Syncope  ECG reviewed by me, the ED Provider: yes    Patient location:  ED  Previous ECG:     Previous ECG:  Compared to current    Comparison to cardiac monitor: Yes    Interpretation:     Interpretation: non-specific    Rate:     ECG rate:  51    ECG rate assessment: bradycardic    Rhythm:     Rhythm: sinus bradycardia    Ectopy:     Ectopy: none    QRS:     QRS axis:  Normal    QRS intervals:  Normal  Conduction:     Conduction: normal    ST segments:     ST segments:  Normal  T waves:     T waves: flattening    Comments:      qtc 442          ED Course  ED Course as of 06/06/23 0722   Sat Jun 03, 202303, 2023 2102 Potassium(!): 3 2  Will replace with PO 40mEQ   2102 hs TnI 0hr: 4   2102 Hemoglobin(!): 11 1  normocytic   2139 D-Dimer, Quant(!): 0 57  Will send for CTA PE study   2140 IMPRESSION:     No cervical spine fracture or traumatic malalignment  2140 C-spine cleared and c-collar removed   2140 IMPRESSION:     No acute intracranial abnormality               HEART Risk Score    Flowsheet Row Most Recent Value   Heart Score Risk Calculator    History 1 Filed at: 06/03/2023 2207   ECG 0 Filed at: 06/03/2023 2207   Age 1 Filed at: 06/03/2023 2207   Risk Factors 1 Filed at: 06/03/2023 2207   Troponin 0 Filed at: 06/03/2023 2207   HEART Score 3 Filed at: 06/03/2023 2207              PERC Rule for PE    Flowsheet Row Most Recent Value   PERC Rule for PE    Age >=50 1 Filed at: 06/03/2023 2036   HR >=100 0 Filed at: 06/03/2023 2036   O2 Sat on room air < 95% 0 Filed at: 06/03/2023 2036   History of PE or DVT 1 Filed at: 06/03/2023 2036   Recent trauma or surgery 0 Filed at: 06/03/2023 2036   Hemoptysis 0 Filed at: 06/03/2023 2036   Exogenous estrogen 0 Filed at: 06/03/2023 2036   Unilateral leg swelling 0 Filed at: 06/03/2023 2036   PERC Rule for PE Results 2 Filed at: 06/03/2023 2036                  Amor Antony' Criteria for PE    Flowsheet Row Most Recent Value   Wells' Criteria for PE    Clinical signs and symptoms of DVT 0 Filed at: 06/03/2023 2036   PE is primary diagnosis or equally likely 0 Filed at: 06/03/2023 2036   HR >100 0 Filed at: 06/03/2023 2036   Immobilization at least 3 days or Surgery in the previous 4 weeks 0 Filed at: 06/03/2023 2036   Previous, objectively diagnosed PE or DVT 1 5 Filed at: 06/03/2023 2036   Hemoptysis 0 Filed at: 06/03/2023 2036   Malignancy with treatment within 6 months or palliative 0 Filed at: 06/03/2023 2036   Amor Antony' Criteria Total 1 5 Filed at: 06/03/2023 2036            Medical Decision Making  52-year-old female with history of hypertension, GERD, history of provoked DVT after knee surgery several years ago presents to the emergency department after 1 episode of syncope  Differential diagnosis includes but is not limited to: Vasovagal, electrolyte abnormality, cardiac arrhythmia, PE    Here in the department, patient is hemodynamically stable and afebrile, heart rate persistently mid 50s asymptomatic, normal work of breathing satting 100% on room air  She is alert and oriented x3, speaking to me in full sentences  She is not in acute distress    Her exam is notable for some left lateral neck pain, which she says only appeared after they put the c-collar on her  Otherwise, her exam is unremarkable  CBC notable for a normocytic anemia, hemoglobin 11 1, baseline appears to be 12 5-13? No white count, normal platelets  Mild hypokalemia to 3 2, will replace here  Initial troponin is 4, 2-hour delta is -2  Her D-dimer is elevated to 0 57, age-adjusted still remains elevated  CTA PE study does not reveal evidence of embolus, however does note presence of small hiatal hernia which could possibly be the cause of patient's mild epigastric chest pressure  She is updated on the findings of the study  CT head and cervical spine are without acute abnormalities  The etiology of her syncopal episode earlier today is not entirely clear; here she is bradycardic however this appears chronic for her, and review of past office visits  HEART score 3, recommended she follow up with her PCP  Discussion of strict return precautions  Patient discharged to home in stable condition  Amount and/or Complexity of Data Reviewed  Labs: ordered  Decision-making details documented in ED Course  Radiology: ordered  Risk  Prescription drug management  Disposition  Final diagnoses:   Syncope   Chest pain   Hypokalemia     Time reflects when diagnosis was documented in both MDM as applicable and the Disposition within this note     Time User Action Codes Description Comment    6/3/2023 10:06 PM Kala  Add [R55] Syncope     6/3/2023 10:06 PM Kala  Add [R07 9] Chest pain     6/3/2023 10:06 PM Kala  Add [E87 6] Hypokalemia       ED Disposition     ED Disposition   Discharge    Condition   Stable    Date/Time   Sat Savage 3, 2023 10:07 PM    Comment   Leticia Deal discharge to home/self care                 Follow-up Information     Follow up With Specialties Details Why Jacek Hodge MD Family Medicine Schedule an appointment as soon as possible for a visit in 1 week To discuss treatment and further evaluation [address] [place]  [place]   [address]  [id number]  [phone]            Discharge Medication List as of 6/3/2023 10:11 PM      CONTINUE these medications which have NOT CHANGED    Details   lisinopril (ZESTRIL) 20 mg tablet Take 1 tablet (20 mg total) by mouth daily, Starting Wed 8/3/2022, Normal      loratadine (CLARITIN) 10 mg tablet Take 1 tablet (10 mg total) by mouth daily, Starting Wed 5/17/2023, Normal           No discharge procedures on file  PDMP Review     None           ED Provider  Attending physically available and evaluated Brennon Mcdonough I managed the patient along with the ED Attending      Electronically Signed by         Razia Velasquez MD  06/06/23 1124

## 2023-06-03 NOTE — Clinical Note
Noah Stephens was seen and treated in our emergency department on 6/3/2023  Diagnosis:     Lakshmi Henry  may return to work on return date, is off the rest of the shift today  She may return on this date: 06/06/2023         If you have any questions or concerns, please don't hesitate to call        Subhash Ambrosio MD    ______________________________           _______________          _______________  [facility] Representative                              Date                                Time

## 2023-06-04 LAB
ATRIAL RATE: 51 BPM
P AXIS: 63 DEGREES
PR INTERVAL: 166 MS
QRS AXIS: 17 DEGREES
QRSD INTERVAL: 78 MS
QT INTERVAL: 480 MS
QTC INTERVAL: 442 MS
T WAVE AXIS: 32 DEGREES
VENTRICULAR RATE: 51 BPM

## 2023-06-04 PROCEDURE — 93010 ELECTROCARDIOGRAM REPORT: CPT | Performed by: INTERNAL MEDICINE

## 2023-06-04 NOTE — ED ATTENDING ATTESTATION
6/3/2023  ICorinna, DO, saw and evaluated the patient  I have discussed the patient with the resident/non-physician practitioner and agree with the resident's/non-physician practitioner's findings, Plan of Care, and MDM as documented in the resident's/non-physician practitioner's note, except where noted  All available labs and Radiology studies were reviewed  I was present for key portions of any procedure(s) performed by the resident/non-physician practitioner and I was immediately available to provide assistance  At this point I agree with the current assessment done in the Emergency Department  I have conducted an independent evaluation of this patient a history and physical is as follows:    ED Course   47year old female presents to the ED for evaluation after having a syncopal event at home witnessed by family  Patient recalls feeling lightheaded, dizzy, nauseated, and hot prior to syncope (was cooking in kitchen) felt overheated and went to the BR as she thought she was going to vomit  Then she had to sit down, fell forward from a seated position onto floor  Family reports she was unconscious for approx 5 minutes before waking  No seizure activity or incontinence  No tongue biting  Patient recalls having some chest pressure earlier today and has since resolved  PmhX: syncope 12 years ago, DVT provoked by LE surgery    PE: Patient is awake and alert, no acute distress  Cervical collar in place  Tenderness along the left lateral cervical paraspinal musculature  GCS is 15  Heart is regular rate and rhythm without ectopy  Equal bilateral breath sounds noted  Lungs are clear to auscultation  Abdomen is soft, nontender, nondistended with normal active bowel sounds  5 of 5 strength in all 4 extremities  No lower extremity edema or tenderness  Speech is clear and appropriate  Assessment: 52-year-old female presents after having a syncopal episode    Differential diagnosis includes was not limited to vasovagal syncope, dehydration, electrolyte abnormality, cardiac dysrhythmia, anemia, PE  Plan: We will check EKG, electrolytes, cardiac troponin, D-dimer, will check CT head and cervical spine due to head injury  Fortunately the patient has remained hemodynamically stable and her work-up is reassuring  CT negative for pulmonary embolism, CT head and neck unremarkable  Patient's potassium level slightly low and replaced  Discussed syncope and likelihood of vasovagal etiology  Discussed signs and symptoms to return to the emergency department  Patient felt comfortable discharge plan    Critical Care Time  Procedures

## 2023-06-05 ENCOUNTER — VBI (OUTPATIENT)
Dept: ADMINISTRATIVE | Facility: OTHER | Age: 53
End: 2023-06-05

## 2023-06-05 NOTE — TELEPHONE ENCOUNTER
Clarissa Vargas    ED Visit Information     Ed visit date: 6/3/2023  Diagnosis Description: Syncope  In Network? Yes Quirino Rankin  Discharge status: Home  Discharged with meds ? No  Number of ED visits to date: 1  ED Severity:3     Outreach Information    Outreach successful: Yes 1  Date letter mailed:N/A  Date Finalized:6/5/2023    Care Coordination    Follow up appointment with pcp: no f/u not scheduled  Transportation issues ? No    [employer] type: 3 Day Outreach  Emergent necessity warranted by diagnosis: Yes  [facility] PCP: Yes  Transportation: Ambulance Transport  Ambulance - Was Pt given choice of of ED: Yes  If able to choose an ED   Would you have chosen [facility]: Yes  Called PCP first?: No  Feels able to call PCP for urgent problems ?: Yes  Understands what emergencies can be handled by PCP ?: Yes  Ever any problems getting appointment with PCP for minor emergency/urgency problems?: No  Practice Contacted Patient ?: No  Pt had ED follow up with pcp/staff ?: No    Seen for follow-up out of network ?: No  Reason Patient went to ED instead of Urgent Care or PCP?: Perceived Severity of Illness  Urgent care Education?: Yes  06/05/2023 10:20 AM EDT by Cristina Carney 06/05/2023 10:20 AM EDT by Jose Johnson (Self) [phone] (JECABD)[phone] (Mobile)  [phone] (Mobile) Remove  - Call [employer] - ED outreach successful

## 2023-08-25 DIAGNOSIS — I10 PRIMARY HYPERTENSION: ICD-10-CM

## 2023-08-25 RX ORDER — LISINOPRIL 20 MG/1
20 TABLET ORAL DAILY
Qty: 90 TABLET | Refills: 3 | OUTPATIENT
Start: 2023-08-25

## 2023-09-08 DIAGNOSIS — I10 PRIMARY HYPERTENSION: ICD-10-CM

## 2023-09-08 RX ORDER — LISINOPRIL 20 MG/1
20 TABLET ORAL DAILY
Qty: 90 TABLET | Refills: 3 | Status: SHIPPED | OUTPATIENT
Start: 2023-09-08

## 2023-10-10 ENCOUNTER — OFFICE VISIT (OUTPATIENT)
Dept: FAMILY MEDICINE CLINIC | Facility: CLINIC | Age: 53
End: 2023-10-10
Payer: MEDICARE

## 2023-10-10 VITALS
TEMPERATURE: 97.8 F | WEIGHT: 181.5 LBS | BODY MASS INDEX: 30.99 KG/M2 | OXYGEN SATURATION: 97 % | HEART RATE: 67 BPM | RESPIRATION RATE: 18 BRPM | HEIGHT: 64 IN | SYSTOLIC BLOOD PRESSURE: 118 MMHG | DIASTOLIC BLOOD PRESSURE: 74 MMHG

## 2023-10-10 DIAGNOSIS — Z12.11 COLON CANCER SCREENING: ICD-10-CM

## 2023-10-10 DIAGNOSIS — M25.562 CHRONIC PAIN OF LEFT KNEE: ICD-10-CM

## 2023-10-10 DIAGNOSIS — E66.9 OBESITY (BMI 30.0-34.9): ICD-10-CM

## 2023-10-10 DIAGNOSIS — I10 PRIMARY HYPERTENSION: Primary | ICD-10-CM

## 2023-10-10 DIAGNOSIS — Z12.31 ENCOUNTER FOR SCREENING MAMMOGRAM FOR BREAST CANCER: ICD-10-CM

## 2023-10-10 DIAGNOSIS — Z12.11 SCREENING FOR COLORECTAL CANCER: ICD-10-CM

## 2023-10-10 DIAGNOSIS — Z12.12 SCREENING FOR COLORECTAL CANCER: ICD-10-CM

## 2023-10-10 DIAGNOSIS — G89.29 CHRONIC PAIN OF LEFT KNEE: ICD-10-CM

## 2023-10-10 DIAGNOSIS — Z00.00 ANNUAL PHYSICAL EXAM: ICD-10-CM

## 2023-10-10 PROCEDURE — 99396 PREV VISIT EST AGE 40-64: CPT | Performed by: FAMILY MEDICINE

## 2023-10-10 NOTE — PROGRESS NOTES
[address] [facility] PRACT[facility]    NAME: Eliu Porras  AGE: 47 y.o. SEX: female  : 1970     DATE: 10/10/2023     Assessment and Plan:     Problem List Items Addressed This Visit        Cardiovascular and Mediastinum    Primary hypertension - Primary    Relevant Orders    Comprehensive metabolic panel    CBC and Platelet    TSH, 3rd generation with Free T4 reflex       Other    Obesity (BMI 30.0-34. 9)    Relevant Orders    Lipid panel    HEMOGLOBIN A1C W/ EAG ESTIMATION   Other Visit Diagnoses     Colon cancer screening        Annual physical exam        Encounter for screening mammogram for breast cancer        Relevant Orders    Mammo screening bilateral w 3d & cad    Screening for colorectal cancer        Relevant Orders    Ambulatory referral to Gastroenterology    Chronic pain of left knee        Relevant Orders    XR knee 3 vw left non injury          Immunizations and preventive care screenings were discussed with patient today. Appropriate education was printed on patient's after visit summary. Counseling:  Alcohol/drug use: discussed moderation in alcohol intake, the recommendations for healthy alcohol use, and avoidance of illicit drug use. Dental Health: discussed importance of regular tooth brushing, flossing, and dental visits. Injury prevention: discussed safety/seat belts, safety helmets, smoke detectors, carbon dioxide detectors, and smoking near bedding or upholstery. Sexual health: discussed sexually transmitted diseases, partner selection, use of condoms, avoidance of unintended pregnancy, and contraceptive alternatives. Exercise: the importance of regular exercise/physical activity was discussed. Recommend exercise 3-5 times per week for at least 30 minutes. Return in 6 months (on 4/10/2024).      Chief Complaint:     Chief Complaint   Patient presents with   • Physical Exam      History of Present Illness:     Adult Annual Physical   Patient here for a comprehensive physical exam. The patient reports no problems. Diet and Physical Activity  Diet/Nutrition: well balanced diet. Exercise: no formal exercise. Depression Screening  PHQ-2/9 Depression Screening    Little interest or pleasure in doing things: 0 - not at all  Feeling down, depressed, or hopeless: 0 - not at all  PHQ-2 Score: 0  PHQ-2 Interpretation: Negative depression screen       General Health  Sleep: sleeps well and gets 7-8 hours of sleep on average. Hearing: normal - bilateral.  Vision: goes for regular eye exams. Dental: regular dental visits. /GYN Health  Follows with obhyn       Review of Systems:     Review of Systems   Constitutional: Negative for fatigue and fever. HENT: Negative for sore throat. Eyes: Negative for visual disturbance. Respiratory: Negative for cough, chest tightness and shortness of breath. Cardiovascular: Negative for chest pain, palpitations and leg swelling. Gastrointestinal: Negative for abdominal pain, constipation, diarrhea and nausea. Endocrine: Negative for cold intolerance and heat intolerance. Genitourinary: Negative for flank pain. Musculoskeletal: Negative for back pain and neck pain. Skin: Negative for rash. Neurological: Negative for headaches. Psychiatric/Behavioral: Negative for behavioral problems and confusion.       Past Medical History:     Past Medical History:   Diagnosis Date   • Acute respiratory disease due to COVID-19 virus 1/19/2021   • Hypertension       Past Surgical History:     Past Surgical History:   Procedure Laterality Date   • KNEE SURGERY     • OTHER SURGICAL HISTORY      right surgery       Social History:     Social History     Socioeconomic History   • Marital status: Single     Spouse name: None   • Number of children: None   • Years of education: None   • Highest education level: None   Occupational History   • None   Tobacco Use   • Smoking status: Never • Smokeless tobacco: Never   Vaping Use   • Vaping Use: Never used   Substance and Sexual Activity   • Alcohol use: Never   • Drug use: Never   • Sexual activity: Not Currently     Partners: Male     Birth control/protection: None   Other Topics Concern   • None   Social History Narrative   • None     Social Determinants of Health     Financial Resource Strain: Medium Risk (5/17/2022)    Overall Financial Resource Strain (CARDIA)    • Difficulty of Paying Living Expenses: Somewhat hard   Food Insecurity: Food Insecurity Present (5/17/2022)    Hunger Vital Sign    • Worried About Running Out of Food in the Last Year: Sometimes true    • Ran Out of Food in the Last Year: Sometimes true   Transportation Needs: No Transportation Needs (5/17/2022)    PRAPARE - Transportation    • Lack of Transportation (Medical): No    • Lack of Transportation (Non-Medical): No   Physical Activity: Not on file   Stress: Not on file   Social Connections: Not on file   Intimate Partner Violence: Not on file   Housing Stability: Not on file      Family History:     Family History   Problem Relation Age of Onset   • No Known Problems Mother    • No Known Problems Father    • No Known Problems Sister    • No Known Problems Maternal Grandmother    • No Known Problems Maternal Grandfather    • No Known Problems Paternal Grandmother    • No Known Problems Paternal Grandfather    • No Known Problems Sister    • No Known Problems Sister    • No Known Problems Maternal Aunt    • No Known Problems Maternal Aunt       Current Medications:     Current Outpatient Medications   Medication Sig Dispense Refill   • lisinopril (ZESTRIL) 20 mg tablet Take 1 tablet (20 mg total) by mouth daily 90 tablet 3   • loratadine (CLARITIN) 10 mg tablet Take 1 tablet (10 mg total) by mouth daily 90 tablet 3     No current facility-administered medications for this visit.       Allergies:     No Known Allergies   Physical Exam:     /74 (BP Location: Left arm, Patient Position: Sitting, Cuff Size: Standard)   Pulse 67   Temp 97.8 °F (36.6 °C) (Tympanic)   Resp 18   Ht 5' 4" (1.626 m)   Wt 82.3 kg (181 lb 8 oz)   LMP 11/23/2020 (Approximate)   SpO2 97%   BMI 31.15 kg/m²     Physical Exam  Vitals and nursing note reviewed. Constitutional:       Appearance: Normal appearance. She is well-developed. HENT:      Head: Normocephalic and atraumatic. Eyes:      Extraocular Movements: Extraocular movements intact. Pupils: Pupils are equal, round, and reactive to light. Cardiovascular:      Rate and Rhythm: Normal rate and regular rhythm. Pulmonary:      Effort: Pulmonary effort is normal.      Breath sounds: Normal breath sounds. Abdominal:      General: Bowel sounds are normal.      Palpations: Abdomen is soft. Musculoskeletal:         General: Tenderness present. Cervical back: Normal range of motion. Comments: Crepitus left knee      Skin:     General: Skin is warm and dry. Neurological:      General: No focal deficit present. Mental Status: She is alert and oriented to person, place, and time.    Psychiatric:         Mood and Affect: Mood normal.         Speech: Speech normal.         Behavior: Behavior normal.          Gracy Schlatter, MD  [facility] [address]

## 2024-09-07 DIAGNOSIS — I10 PRIMARY HYPERTENSION: ICD-10-CM

## 2024-09-07 RX ORDER — LISINOPRIL 20 MG/1
20 TABLET ORAL DAILY
Qty: 90 TABLET | Refills: 0 | Status: SHIPPED | OUTPATIENT
Start: 2024-09-07

## 2024-09-09 ENCOUNTER — TELEPHONE (OUTPATIENT)
Dept: INTERNAL MEDICINE CLINIC | Facility: CLINIC | Age: 54
End: 2024-09-09

## 2024-09-09 NOTE — TELEPHONE ENCOUNTER
Patient calling to schedule NP appt. She does not have any insurance. Would like to establish care at [facility]. [facility] since its closer to her [place] address. Gave phone number and told her to call to schedule new patient appt.

## 2024-12-17 ENCOUNTER — OFFICE VISIT (OUTPATIENT)
Dept: INTERNAL MEDICINE CLINIC | Facility: CLINIC | Age: 54
End: 2024-12-17

## 2024-12-17 VITALS
SYSTOLIC BLOOD PRESSURE: 132 MMHG | DIASTOLIC BLOOD PRESSURE: 83 MMHG | HEART RATE: 56 BPM | OXYGEN SATURATION: 98 % | BODY MASS INDEX: 32.37 KG/M2 | HEIGHT: 64 IN | TEMPERATURE: 98.3 F | WEIGHT: 189.6 LBS

## 2024-12-17 DIAGNOSIS — Z59.9 FINANCIAL DIFFICULTIES: ICD-10-CM

## 2024-12-17 DIAGNOSIS — Z87.828 HISTORY OF TEAR OF MENISCUS OF KNEE JOINT: ICD-10-CM

## 2024-12-17 DIAGNOSIS — Z23 ENCOUNTER FOR IMMUNIZATION: ICD-10-CM

## 2024-12-17 DIAGNOSIS — Z98.890 HISTORY OF ARTHROSCOPY OF RIGHT KNEE: ICD-10-CM

## 2024-12-17 DIAGNOSIS — M79.662 PAIN IN BOTH LOWER LEGS: Primary | ICD-10-CM

## 2024-12-17 DIAGNOSIS — M79.661 PAIN IN BOTH LOWER LEGS: Primary | ICD-10-CM

## 2024-12-17 DIAGNOSIS — M79.2 NEURALGIA: ICD-10-CM

## 2024-12-17 DIAGNOSIS — Z86.718 HISTORY OF DVT (DEEP VEIN THROMBOSIS): ICD-10-CM

## 2024-12-17 DIAGNOSIS — R53.83 OTHER FATIGUE: ICD-10-CM

## 2024-12-17 DIAGNOSIS — I10 PRIMARY HYPERTENSION: ICD-10-CM

## 2024-12-17 PROCEDURE — 90673 RIV3 VACCINE NO PRESERV IM: CPT | Performed by: FAMILY MEDICINE

## 2024-12-17 PROCEDURE — 90471 IMMUNIZATION ADMIN: CPT | Performed by: FAMILY MEDICINE

## 2024-12-17 PROCEDURE — 99204 OFFICE O/P NEW MOD 45 MIN: CPT | Performed by: FAMILY MEDICINE

## 2024-12-17 RX ORDER — LISINOPRIL 20 MG/1
20 TABLET ORAL DAILY
Qty: 90 TABLET | Refills: 3 | Status: SHIPPED | OUTPATIENT
Start: 2024-12-17

## 2024-12-17 RX ORDER — GABAPENTIN 100 MG/1
CAPSULE ORAL
Qty: 30 CAPSULE | Refills: 3 | Status: SHIPPED | OUTPATIENT
Start: 2024-12-17

## 2024-12-17 SDOH — ECONOMIC STABILITY - INCOME SECURITY: PROBLEM RELATED TO HOUSING AND ECONOMIC CIRCUMSTANCES, UNSPECIFIED: Z59.9

## 2024-12-17 NOTE — ASSESSMENT & PLAN NOTE
Patient has been noting fatigue more so over the last 3 months , she states she sleeps well , wakes feeling rested , she is tired by the end of her work day , doesn't nap when she gets home , She has been  working one extra day per week  ? Contributing  she is good with hydration and healthy diet  no exercise outside of work activity Recommend check labs , cmp , cbc , tsh  consider sleep study in the future   Orders:    Comprehensive metabolic panel; Future    CBC and differential; Future    TSH, 3rd generation with Free T4 reflex; Future

## 2024-12-17 NOTE — PROGRESS NOTES
Name: Chacha Coles      : 1970      MRN: [id number]  Encounter Provider: Clementine Chávez MD  Encounter Date: 2024   Encounter department: [facility]    Assessment & Plan  Encounter for immunization    Orders:    influenza vaccine, recombinant, PF, 0.5 mL IM (Flublok)    History of tear of meniscus of knee joint         History of DVT (deep vein thrombosis)         Primary hypertension  BP well controlled on zestril 20 mg continue same dosing   Orders:    lisinopril (ZESTRIL) 20 mg tablet; Take 1 tablet (20 mg total) by mouth daily    History of arthroscopy of right knee  Pat had lateral meniscal tear , menisectomy and saucerization of lateral discoid mensicus in  , [facility] , she did well thereafter , she has bilateral knee pain off and on , L > R see other ,       Other fatigue  Patient has been noting fatigue more so over the last 3 months , she states she sleeps well , wakes feeling rested , she is tired by the end of her work day , doesn't nap when she gets home , She has been  working one extra day per week [employer] ? Contributing  she is good with hydration and healthy diet  no exercise outside of work activity Recommend check labs , cmp , cbc , tsh  consider sleep study in the future   Orders:    Comprehensive metabolic panel; Future    CBC and differential; Future    TSH, 3rd generation with Free T4 reflex; Future    Pain in both lower legs  See detailed HPI , pt has had sx x ~ 8 months she had a change in work schedule working one more day per week at that time , no injury  Exam lower legs no swelling no deformity no rashes or open areas no varicosities , pedal pulses full , skin warm and dry     Recommend local care , warm showers , elevate legs when home in the afternoon evening start gabapentin 100 mg in pm after work and 1 po hs   Follow up here 3-4 weeks   Orders:    gabapentin (NEURONTIN) 100 mg capsule; 1 po in afternoon and 1  po hs for leg pain    Neuralgia    Orders:    gabapentin (NEURONTIN) 100 mg capsule; 1 po in afternoon and 1 po hs for leg pain    BMI Counseling: Body mass index is 32.54 kg/m². The BMI is above normal. Nutrition recommendations include decreasing portion sizes, encouraging healthy choices of fruits and vegetables, decreasing fast food intake, consuming healthier snacks, limiting drinks that contain sugar and reducing intake of saturated and trans fat. Exercise recommendations include exercising 3-5 times per week. Rationale for BMI follow-up plan is due to patient being overweight or obese.         History of Present Illness     Knee Pain     Fatigue  Associated symptoms include arthralgias, fatigue and weakness. Pertinent negatives include no abdominal pain, chest pain, chills, coughing, fever, joint swelling, rash, sore throat or vomiting.    New patient here to establish care , here with Daughter   History HTN , bilateral knee pain , L > R had R knee arthroscopy 2011, lateral menisectomy and saucerization discoid lateral meniscus , she has pain in knees and pain burning in lower legs   She has developed the lower leg pain over the last 8 months , she has been at the same job , working one more day per week  , she washes dishes ( pain started around that time )She has continued this regimen , she is not using any topical creams , she takes tylenol for the pain some days , it helps a bit . She can feel some better after she is able to get off her feet   The lower leg pain comes and goes , one leg bothers one day then the other leg on another day - swelling in the legs     The knee pain bothers her more in the cold weather   Her legs feel hot burning feeling , - low back pain   Diet , water intake daily 6-7 bottles , she is able to eat at least 2 good meals each day   Complains of fatigue x 3 months , she sleeps well , wakes feeling rested , She starts to feel tired later in her work day and when she gets home  from work, she doesn't nap in the evening   No hx anemia , + menopausal   Review of Systems   Constitutional:  Positive for fatigue. Negative for chills and fever.   HENT:  Negative for ear pain and sore throat.    Eyes:  Negative for pain and visual disturbance.   Respiratory:  Negative for cough and shortness of breath.    Cardiovascular:  Negative for chest pain and palpitations.   Gastrointestinal:  Negative for abdominal pain and vomiting.   Genitourinary:  Negative for dysuria and hematuria.   Musculoskeletal:  Positive for arthralgias. Negative for back pain, gait problem and joint swelling.   Skin:  Negative for color change and rash.   Neurological:  Positive for weakness. Negative for seizures and syncope.        Burning pain lower legs bilateral    All other systems reviewed and are negative.    Past Medical History:   Diagnosis Date    Acute respiratory disease due to COVID-19 virus 1/19/2021    Hypertension      Past Surgical History:   Procedure Laterality Date    KNEE SURGERY      OTHER SURGICAL HISTORY      right surgery      Family History   Problem Relation Age of Onset    No Known Problems Mother     No Known Problems Father     No Known Problems Sister     No Known Problems Sister     No Known Problems Sister     No Known Problems Maternal Grandmother     No Known Problems Maternal Grandfather     No Known Problems Paternal Grandmother     No Known Problems Paternal Grandfather     No Known Problems Maternal Aunt     No Known Problems Maternal Aunt     No Known Problems Daughter     No Known Problems Daughter     No Known Problems Daughter      Social History     Tobacco Use    Smoking status: Never    Smokeless tobacco: Never   Vaping Use    Vaping status: Never Used   Substance and Sexual Activity    Alcohol use: Never    Drug use: Never    Sexual activity: Not Currently     Partners: Male     Birth control/protection: None     Current Outpatient Medications on File Prior to Visit   Medication  "Sig    loratadine (CLARITIN) 10 mg tablet Take 1 tablet (10 mg total) by mouth daily    [DISCONTINUED] lisinopril (ZESTRIL) 20 mg tablet TAKE 1 TABLET BY MOUTH EVERY DAY     No Known Allergies  Immunization History   Administered Date(s) Administered    COVID-19 PFIZER VACCINE 0.3 ML IM 06/01/2021, 06/22/2021    INFLUENZA 12/28/2021    Influenza Recombinant Preservative Free Im 12/17/2024    Influenza, recombinant, quadrivalent,injectable, preservative free 12/28/2021     Objective   /83 (BP Location: Right arm, Patient Position: Sitting, Cuff Size: Large)   Pulse 56   Temp 98.3 °F (36.8 °C) (Temporal)   Ht 5' 4\" (1.626 m)   Wt 86 kg (189 lb 9.6 oz)   LMP 11/23/2020 (Approximate)   SpO2 98%   BMI 32.54 kg/m²     Physical Exam  Vitals and nursing note reviewed.   Constitutional:       General: She is not in acute distress.     Appearance: She is well-developed.      Comments: Skin with good color turgor , well hydrated ,no distress noted  obese   HENT:      Head: Normocephalic and atraumatic.      Right Ear: No decreased hearing noted. No middle ear effusion. There is no impacted cerumen.      Left Ear: No decreased hearing noted.  No middle ear effusion. There is no impacted cerumen.      Mouth/Throat:      Pharynx: Oropharynx is clear.   Eyes:      Conjunctiva/sclera: Conjunctivae normal.   Neck:      Thyroid: No thyromegaly.   Cardiovascular:      Rate and Rhythm: Normal rate and regular rhythm.      Pulses:           Dorsalis pedis pulses are 3+ on the right side and 3+ on the left side.        Posterior tibial pulses are 1+ on the right side and 1+ on the left side.      Heart sounds: Normal heart sounds. No murmur heard.  Pulmonary:      Effort: Pulmonary effort is normal. No respiratory distress.      Breath sounds: Normal breath sounds.   Abdominal:      Palpations: Abdomen is soft.      Tenderness: There is no abdominal tenderness.   Musculoskeletal:         General: No swelling.      Cervical " back: Neck supple.      Right knee: No deformity. Decreased range of motion. No tenderness.      Left knee: No deformity. Decreased range of motion. No tenderness.      Right lower leg: No swelling, deformity, lacerations, tenderness or bony tenderness. No edema.      Left lower leg: No swelling, deformity, lacerations, tenderness or bony tenderness. No edema.      Right ankle: Normal.      Left ankle: Normal.      Right foot: Normal.      Left foot: Normal.   Lymphadenopathy:      Cervical:      Right cervical: No superficial cervical adenopathy.     Left cervical: No superficial cervical adenopathy.   Skin:     General: Skin is warm and dry.      Capillary Refill: Capillary refill takes less than 2 seconds.   Neurological:      Mental Status: She is alert.      Comments: Non focal exam , walks favoring her knees    Psychiatric:         Attention and Perception: Attention normal.         Mood and Affect: Mood normal.         Speech: Speech normal.         Behavior: Behavior normal.

## 2024-12-17 NOTE — ASSESSMENT & PLAN NOTE
See detailed HPI , pt has had sx x ~ 8 months she had a change in work schedule working one more day per week at that time , no injury  Exam lower legs no swelling no deformity no rashes or open areas no varicosities , pedal pulses full , skin warm and dry     Recommend local care , warm showers , elevate legs when home in the afternoon evening start gabapentin 100 mg in pm after work and 1 po hs   Follow up here 3-4 weeks   Orders:    gabapentin (NEURONTIN) 100 mg capsule; 1 po in afternoon and 1 po hs for leg pain

## 2024-12-17 NOTE — ASSESSMENT & PLAN NOTE
BP well controlled on zestril 20 mg continue same dosing   Orders:    lisinopril (ZESTRIL) 20 mg tablet; Take 1 tablet (20 mg total) by mouth daily

## 2024-12-17 NOTE — ASSESSMENT & PLAN NOTE
Pat had lateral meniscal tear , menisectomy and saucerization of lateral discoid mensicus in 2011 , [facility] , she did well thereafter , she has bilateral knee pain off and on , L > R see other ,

## 2024-12-26 ENCOUNTER — PATIENT OUTREACH (OUTPATIENT)
Dept: INTERNAL MEDICINE CLINIC | Facility: CLINIC | Age: 54
End: 2024-12-26

## 2024-12-26 NOTE — PROGRESS NOTES
SW has reached out to pt via  ID # [id number] this date as per PCP request re SDOH.  Pt being seen due to leg pain.  She works as a .  As per [employer] Counselor Devaughn  pt has started her [employer] Application but needs to bring in proof of income.    Pt resides with one dependent.    Pt not eligible for on going MA /QUINN.    Sw had to leave a message requesting a return call.

## 2024-12-27 ENCOUNTER — PATIENT OUTREACH (OUTPATIENT)
Dept: INTERNAL MEDICINE CLINIC | Facility: CLINIC | Age: 54
End: 2024-12-27

## 2024-12-27 NOTE — LETTER
12/27/24    Estimado/a Leandro Gutierrez un coordinador de la atención médica en [facility]  [address] [place] [address]  [facility] [address] [phone]. Intentamos comunicarnos con usted por teléfono varias veces. Es importante que se comunique con nosotros al Dept: [phone] or [phone] para que podamos ofrecerle ayuda con gatito necesidades de atención médica.     Atentamente.         Lexie Santiago

## 2024-12-27 NOTE — PROGRESS NOTES
SW has reached out to pt via  ID # [id number] to f/u on referral for the SDOH however there was no answer.  SW tried again and was able to leave a message.    ELIAN has sent an out reach letter via MY Chart this date.    SW to remain available to assist as able.

## 2025-01-21 ENCOUNTER — APPOINTMENT (OUTPATIENT)
Dept: LAB | Facility: CLINIC | Age: 55
End: 2025-01-21

## 2025-01-21 DIAGNOSIS — R53.83 OTHER FATIGUE: ICD-10-CM

## 2025-01-21 LAB
ALBUMIN SERPL BCG-MCNC: 4.2 G/DL (ref 3.5–5)
ALP SERPL-CCNC: 72 U/L (ref 34–104)
ALT SERPL W P-5'-P-CCNC: 12 U/L (ref 7–52)
ANION GAP SERPL CALCULATED.3IONS-SCNC: 7 MMOL/L (ref 4–13)
AST SERPL W P-5'-P-CCNC: 15 U/L (ref 13–39)
BASOPHILS # BLD AUTO: 0.05 THOUSANDS/ΜL (ref 0–0.1)
BASOPHILS NFR BLD AUTO: 1 % (ref 0–1)
BILIRUB SERPL-MCNC: 0.82 MG/DL (ref 0.2–1)
BUN SERPL-MCNC: 13 MG/DL (ref 5–25)
CALCIUM SERPL-MCNC: 10 MG/DL (ref 8.4–10.2)
CHLORIDE SERPL-SCNC: 104 MMOL/L (ref 96–108)
CO2 SERPL-SCNC: 28 MMOL/L (ref 21–32)
CREAT SERPL-MCNC: 0.62 MG/DL (ref 0.6–1.3)
EOSINOPHIL # BLD AUTO: 0.19 THOUSAND/ΜL (ref 0–0.61)
EOSINOPHIL NFR BLD AUTO: 3 % (ref 0–6)
ERYTHROCYTE [DISTWIDTH] IN BLOOD BY AUTOMATED COUNT: 12.6 % (ref 11.6–15.1)
GFR SERPL CREATININE-BSD FRML MDRD: 103 ML/MIN/1.73SQ M
GLUCOSE P FAST SERPL-MCNC: 94 MG/DL (ref 65–99)
HCT VFR BLD AUTO: 40.7 % (ref 34.8–46.1)
HGB BLD-MCNC: 13.3 G/DL (ref 11.5–15.4)
IMM GRANULOCYTES # BLD AUTO: 0.02 THOUSAND/UL (ref 0–0.2)
IMM GRANULOCYTES NFR BLD AUTO: 0 % (ref 0–2)
LYMPHOCYTES # BLD AUTO: 2.37 THOUSANDS/ΜL (ref 0.6–4.47)
LYMPHOCYTES NFR BLD AUTO: 33 % (ref 14–44)
MCH RBC QN AUTO: 28.7 PG (ref 26.8–34.3)
MCHC RBC AUTO-ENTMCNC: 32.7 G/DL (ref 31.4–37.4)
MCV RBC AUTO: 88 FL (ref 82–98)
MONOCYTES # BLD AUTO: 0.4 THOUSAND/ΜL (ref 0.17–1.22)
MONOCYTES NFR BLD AUTO: 6 % (ref 4–12)
NEUTROPHILS # BLD AUTO: 4.1 THOUSANDS/ΜL (ref 1.85–7.62)
NEUTS SEG NFR BLD AUTO: 57 % (ref 43–75)
NRBC BLD AUTO-RTO: 0 /100 WBCS
PLATELET # BLD AUTO: 286 THOUSANDS/UL (ref 149–390)
PMV BLD AUTO: 11.1 FL (ref 8.9–12.7)
POTASSIUM SERPL-SCNC: 4.3 MMOL/L (ref 3.5–5.3)
PROT SERPL-MCNC: 7.6 G/DL (ref 6.4–8.4)
RBC # BLD AUTO: 4.63 MILLION/UL (ref 3.81–5.12)
SODIUM SERPL-SCNC: 139 MMOL/L (ref 135–147)
TSH SERPL DL<=0.05 MIU/L-ACNC: 1.2 UIU/ML (ref 0.45–4.5)
WBC # BLD AUTO: 7.13 THOUSAND/UL (ref 4.31–10.16)

## 2025-01-21 PROCEDURE — 84443 ASSAY THYROID STIM HORMONE: CPT

## 2025-01-21 PROCEDURE — 85025 COMPLETE CBC W/AUTO DIFF WBC: CPT

## 2025-01-21 PROCEDURE — 80053 COMPREHEN METABOLIC PANEL: CPT

## 2025-01-21 PROCEDURE — 36415 COLL VENOUS BLD VENIPUNCTURE: CPT

## 2025-01-22 ENCOUNTER — RESULTS FOLLOW-UP (OUTPATIENT)
Dept: INTERNAL MEDICINE CLINIC | Facility: CLINIC | Age: 55
End: 2025-01-22

## 2025-04-01 ENCOUNTER — OFFICE VISIT (OUTPATIENT)
Dept: INTERNAL MEDICINE CLINIC | Facility: CLINIC | Age: 55
End: 2025-04-01

## 2025-04-01 VITALS
HEIGHT: 64 IN | WEIGHT: 187 LBS | BODY MASS INDEX: 31.92 KG/M2 | SYSTOLIC BLOOD PRESSURE: 124 MMHG | TEMPERATURE: 97.8 F | DIASTOLIC BLOOD PRESSURE: 80 MMHG | OXYGEN SATURATION: 98 % | HEART RATE: 53 BPM

## 2025-04-01 DIAGNOSIS — J30.2 SEASONAL ALLERGIES: ICD-10-CM

## 2025-04-01 DIAGNOSIS — Z00.00 WELL ADULT EXAM: Primary | ICD-10-CM

## 2025-04-01 DIAGNOSIS — Z12.31 ENCOUNTER FOR SCREENING MAMMOGRAM FOR BREAST CANCER: ICD-10-CM

## 2025-04-01 DIAGNOSIS — Z91.89 NEED FOR DENTAL CARE: ICD-10-CM

## 2025-04-01 DIAGNOSIS — I10 PRIMARY HYPERTENSION: ICD-10-CM

## 2025-04-01 NOTE — ASSESSMENT & PLAN NOTE
Pat agreeable to dental referral she will stop by the office and schedule an appointment  Orders:    Ambulatory Referral to Dentistry; Future

## 2025-04-01 NOTE — PROGRESS NOTES
Adult Annual Physical  Name: Chacha Coles      : 1970      MRN: [id number]  Encounter Provider: Clementine Chávez MD  Encounter Date: 2025   Encounter department: [facility]    Assessment & Plan  Primary hypertension  BP well controlled on current medication , continue same regimen          Well adult exam  Pt agreeable to scheduling eye and dental; exams , given [facility] free mammogram information , referral placed        Seasonal allergies  Avoid triggers as best able , using claritin feels it is not as helpful , recommend trial allegra 180 mg 1 po q day or zyrtec 10 mg 1 po q day  add nasal saline instillations liberally daily , follow up if not feeling better        Need for dental care  Pat agreeable to dental referral she will stop by the office and schedule an appointment  Orders:    Ambulatory Referral to Dentistry; Future    Encounter for screening mammogram for breast cancer    Orders:    Mammo screening bilateral w 3d and cad; Future    Annual Physical Plan       History of Present Illness     Adult Annual Physical Pt here for wellness exam Sister interpreting during office visit   Vision 2 years  Allergies runny stuffy itchy nose eyes all year round  worse spring time   Dental 3 years ago   Hearing normal   Diet proteins , meat chicken fish 2-3 servings , fruits veggies 2 servings q day , 4-5 bottles water , 1 coffee , - soda - juice   Exercise stretches exercises once per week 10- 15 minutes   -Smoking -ETOH   Fam hx Sister HTN , - DM - lipid - CAD   Fam hx cancer negative   Review of Systems   Constitutional:  Negative for chills and fever.   HENT:  Positive for congestion and rhinorrhea. Negative for ear pain and sore throat.    Eyes:  Negative for pain and visual disturbance.   Respiratory:  Negative for cough and shortness of breath.    Cardiovascular:  Negative for chest pain and palpitations.   Gastrointestinal:  Negative for abdominal pain and  "vomiting.   Genitourinary:  Negative for dysuria and hematuria.   Musculoskeletal:  Negative for arthralgias and back pain.   Skin:  Negative for color change and rash.   Allergic/Immunologic: Positive for environmental allergies.   Neurological:  Negative for seizures and syncope.   All other systems reviewed and are negative.        Objective   /80 (BP Location: Left arm, Patient Position: Sitting, Cuff Size: Large)   Pulse (!) 53   Temp 97.8 °F (36.6 °C) (Temporal)   Ht 5' 4\" (1.626 m)   Wt 84.8 kg (187 lb)   LMP 11/23/2020 (Approximate)   SpO2 98%   BMI 32.10 kg/m²     Physical Exam  Vitals and nursing note reviewed.   Constitutional:       General: She is not in acute distress.     Appearance: She is well-developed.      Comments: Skin with good color turgor , well hydrated ,no distress noted     HENT:      Head: Normocephalic and atraumatic.      Right Ear: No decreased hearing noted. No middle ear effusion. There is no impacted cerumen.      Left Ear: No decreased hearing noted.  No middle ear effusion. There is no impacted cerumen.      Nose: No congestion or rhinorrhea.      Mouth/Throat:      Pharynx: Oropharynx is clear.   Eyes:      Conjunctiva/sclera: Conjunctivae normal.   Neck:      Thyroid: No thyromegaly.   Cardiovascular:      Rate and Rhythm: Normal rate and regular rhythm.      Heart sounds: Normal heart sounds. No murmur heard.  Pulmonary:      Effort: Pulmonary effort is normal. No respiratory distress.      Breath sounds: Normal breath sounds.   Abdominal:      Palpations: Abdomen is soft.      Tenderness: There is no abdominal tenderness. There is no right CVA tenderness, left CVA tenderness, guarding or rebound.   Musculoskeletal:         General: No swelling.      Cervical back: Neck supple.   Lymphadenopathy:      Cervical:      Right cervical: No superficial or posterior cervical adenopathy.     Left cervical: No superficial or posterior cervical adenopathy.   Skin:     " General: Skin is warm and dry.      Capillary Refill: Capillary refill takes less than 2 seconds.   Neurological:      Mental Status: She is alert.      Comments: Non focal exam   Psychiatric:         Mood and Affect: Mood normal.         Speech: Speech normal.         Behavior: Behavior normal.         Thought Content: Thought content normal.

## 2025-04-01 NOTE — ASSESSMENT & PLAN NOTE
Pt agreeable to scheduling eye and dental; exams , given [facility] free mammogram information , referral placed

## 2025-04-01 NOTE — ASSESSMENT & PLAN NOTE
Avoid triggers as best able , using claritin feels it is not as helpful , recommend trial allegra 180 mg 1 po q day or zyrtec 10 mg 1 po q day  add nasal saline instillations liberally daily , follow up if not feeling better